# Patient Record
Sex: FEMALE | Race: WHITE | Employment: UNEMPLOYED | URBAN - METROPOLITAN AREA
[De-identification: names, ages, dates, MRNs, and addresses within clinical notes are randomized per-mention and may not be internally consistent; named-entity substitution may affect disease eponyms.]

---

## 2017-12-05 ENCOUNTER — OFFICE VISIT (OUTPATIENT)
Dept: FAMILY MEDICINE CLINIC | Age: 27
End: 2017-12-05

## 2017-12-05 VITALS
TEMPERATURE: 98.2 F | SYSTOLIC BLOOD PRESSURE: 100 MMHG | HEIGHT: 64 IN | HEART RATE: 90 BPM | OXYGEN SATURATION: 100 % | DIASTOLIC BLOOD PRESSURE: 65 MMHG | BODY MASS INDEX: 19.84 KG/M2 | WEIGHT: 116.2 LBS | RESPIRATION RATE: 18 BRPM

## 2017-12-05 DIAGNOSIS — Z13.0 SCREENING FOR DEFICIENCY ANEMIA: ICD-10-CM

## 2017-12-05 DIAGNOSIS — R39.11 URINARY HESITANCY: ICD-10-CM

## 2017-12-05 DIAGNOSIS — F11.10 HEROIN ABUSE (HCC): ICD-10-CM

## 2017-12-05 DIAGNOSIS — Z13.29 SCREENING FOR THYROID DISORDER: ICD-10-CM

## 2017-12-05 DIAGNOSIS — F41.9 ANXIETY AND DEPRESSION: Primary | ICD-10-CM

## 2017-12-05 DIAGNOSIS — F32.A ANXIETY AND DEPRESSION: Primary | ICD-10-CM

## 2017-12-05 DIAGNOSIS — N92.6 MISSED MENSES: ICD-10-CM

## 2017-12-05 LAB
BILIRUB UR QL STRIP: NEGATIVE
GLUCOSE UR-MCNC: NEGATIVE MG/DL
HCG URINE, QL. (POC): NEGATIVE
KETONES P FAST UR STRIP-MCNC: NEGATIVE MG/DL
PH UR STRIP: 7 [PH] (ref 4.6–8)
PROT UR QL STRIP: NEGATIVE
SP GR UR STRIP: 1.01 (ref 1–1.03)
UA UROBILINOGEN AMB POC: NORMAL (ref 0.2–1)
URINALYSIS CLARITY POC: CLEAR
URINALYSIS COLOR POC: YELLOW
URINE BLOOD POC: NEGATIVE
URINE LEUKOCYTES POC: NEGATIVE
URINE NITRITES POC: NEGATIVE
VALID INTERNAL CONTROL?: YES

## 2017-12-05 NOTE — PATIENT INSTRUCTIONS
Learning About Drug Misuse  What is drug misuse? Drug misuse means using drugs in a way that harms you or causes you to harm others. Your doctor may call it substance use disorder or drug abuse. It's possible to misuse almost any type of drug, including illegal drugs, prescription drugs, and over-the-counter drugs. An overdose happens when a person takes more than the normal or recommended amount of a drug. An overdose can result in harmful symptoms or death. Could you have a problem? If there's a chance you may be misusing drugs, it's important to find out. So ask yourself a few questions. · Do you spend a lot of time thinking about your medicine or other drug-getting it and using it? Has that taken the place of other things you used to enjoy? · Have you had problems with your family or friends, or at work, because of your use? · Do you use drugs even when you've told yourself you won't? Do you think you might have a problem? If you do, then you've just taken an important first step. Many people have overcome this problem. And most of them started by reaching out to others, like caring friends or family, their doctor, or a support group. How is drug misuse treated? If you think you may have a problem with drugs, talk to your doctor. You and your doctor can decide whether you have a problem and what type of treatment might help you. You may need to stay in a hospital at first, so that you can be treated for withdrawal symptoms. One of the goals of treatment is helping you get used to life without the drug. Counseling can help you prepare for people or situations that might tempt you to start using again. You can practice these skills through one-on-one counseling, family therapy, or group therapy. Therapy may be part of inpatient treatment, where you stay in a treatment center.  Or it may be part of outpatient treatment, where you can fit your therapy around your job or other responsibilities. Another goal of treatment is getting ongoing support for your drug-free life. Many people find support by going to meetings like Narcotics Anonymous or SMART Recovery. This type of support can help you feel less alone and more motivated to stay drug-free. You might talk to your doctor or do an online search for local treatment programs. Or you might tell a friend or loved one that you need help. Follow-up care is a key part of your treatment and safety. Be sure to make and go to all appointments, and call your doctor if you are having problems. It's also a good idea to know your test results and keep a list of the medicines you take. Where can you learn more? Go to http://timMapMyIDpiotr.info/. Enter [phone] in the search box to learn more about \"Learning About Drug Misuse. \"  Current as of: November 3, 2016  Content Version: 11.4  © 5712-1464 [employer]. Care instructions adapted under license by [place]  (which disclaims liability or warranty for this information). If you have questions about a medical condition or this instruction, always ask your healthcare professional. [address] any warranty or liability for your use of this information. Recovering From Depression: Care Instructions  Your Care Instructions    Taking good care of yourself is important as you recover from depression. In time, your symptoms will fade as your treatment takes hold. Do not give up. Instead, focus your energy on getting better. Your mood will improve. It just takes some time. Focus on things that can help you feel better, such as being with friends and family, eating well, and getting enough rest. But take things slowly. Do not do too much too soon. You will begin to feel better gradually. Follow-up care is a key part of your treatment and safety. Be sure to make and go to all appointments, and call your doctor if you are having problems. It's also a good idea to know your test results and keep a list of the medicines you take. How can you care for yourself at home? Be realistic  · If you have a large task to do, break it up into smaller steps you can handle, and just do what you can. · You may want to put off important decisions until your depression has lifted. If you have plans that will have a major impact on your life, such as marriage, divorce, or a job change, try to wait a bit. Talk it over with friends and loved ones who can help you look at the overall picture first.  · Reaching out to people for help is important. Do not isolate yourself. Let your family and friends help you. Find someone you can trust and confide in, and talk to that person. · Be patient, and be kind to yourself. Remember that depression is not your fault and is not something you can overcome with willpower alone. Treatment is necessary for depression, just like for any other illness. Feeling better takes time, and your mood will improve little by little. Stay active  · Stay busy and get outside. Take a walk, or try some other light exercise. · Talk with your doctor about an exercise program. Exercise can help with mild depression. · Go to a movie or concert. Take part in a Adventism activity or other social gathering. Go to a ball game. · Ask a friend to have dinner with you. Take care of yourself  · Eat a balanced diet with plenty of fresh fruits and vegetables, whole grains, and lean protein. If you have lost your appetite, eat small snacks rather than large meals. · Avoid drinking alcohol or using illegal drugs. Do not take medicines that have not been prescribed for you. They may interfere with medicines you may be taking for depression, or they may make your depression worse. · Take your medicines exactly as they are prescribed. You may start to feel better within 1 to 3 weeks of taking antidepressant medicine.  But it can take as many as 6 to 8 weeks to see more improvement. If you have questions or concerns about your medicines, or if you do not notice any improvement by 3 weeks, talk to your doctor. · If you have any side effects from your medicine, tell your doctor. Antidepressants can make you feel tired, dizzy, or nervous. Some people have dry mouth, constipation, headaches, sexual problems, or diarrhea. Many of these side effects are mild and will go away on their own after you have been taking the medicine for a few weeks. Some may last longer. Talk to your doctor if side effects are bothering you too much. You might be able to try a different medicine. · Get enough sleep. If you have problems sleeping:  ¨ Go to bed at the same time every night, and get up at the same time every morning. ¨ Keep your bedroom dark and quiet. ¨ Do not exercise after 5:00 p.m. ¨ Avoid drinks with caffeine after 5:00 p.m. · Avoid sleeping pills unless they are prescribed by the doctor treating your depression. Sleeping pills may make you groggy during the day, and they may interact with other medicine you are taking. · If you have any other illnesses, such as diabetes, heart disease, or high blood pressure, make sure to continue with your treatment. Tell your doctor about all of the medicines you take, including those with or without a prescription. · Keep the numbers for these national suicide hotlines: 7-156-592-TALK (6-727.184.5212) and 4-601-KNSRJOY (7-446.709.7855). If you or someone you know talks about suicide or feeling hopeless, get help right away. When should you call for help? Call 911 anytime you think you may need emergency care. For example, call if:  ? · You feel like hurting yourself or someone else. ? · Someone you know has depression and is about to attempt or is attempting suicide. ?Call your doctor now or seek immediate medical care if:  ? · You hear voices.    ? · Someone you know has depression and:  ¨ Starts to give away his or her possessions. ¨ Uses illegal drugs or drinks alcohol heavily. ¨ Talks or writes about death, including writing suicide notes or talking about guns, knives, or pills. ¨ Starts to spend a lot of time alone. ¨ Acts very aggressively or suddenly appears calm. ? Watch closely for changes in your health, and be sure to contact your doctor if:  ? · You do not get better as expected. Where can you learn more? Go to http://tim-piotr.info/. Enter N208 in the search box to learn more about \"Recovering From Depression: Care Instructions. \"  Current as of: May 12, 2017  Content Version: 11.4  © 1663-0520 [employer], [employer]. Care instructions adapted under license by [place] (which disclaims liability or warranty for this information). If you have questions about a medical condition or this instruction, always ask your healthcare professional. [place] [address] any warranty or liability for your use of this information.

## 2017-12-05 NOTE — PROGRESS NOTES
Chad Payan is a 32 y.o. female and presents with    Chief Complaint   Patient presents with    Establish Care    Depression    Anxiety    Urinary Hesitancy     foamy as well        Subjective:  Depression/anxiety/drug abuse  Patient complains of depression. She complains of depressed mood, weight loss, insomnia, psychomotor agitation, fatigue, feelings of worthlessness/guilt, difficulty concentrating and impaired memory. Onset was approximately several years ago, symptoms are stable at times when she is medicated properly. She states she was in long-term for a year and they gave her zoloft. She denies current suicidal and homicidal plan or intent. She does have a stated drug abuse problem, drug of choice heroin. She expresses that she uses to control psych issues and that her use is intermittent monthly. Patient also has complaints today of hair loss, weight loss, and acne. She states this is a new onset in past several months. No Known Allergies      Review of Systems   Constitutional: Positive for malaise/fatigue and weight loss. Skin:        Acne, hair loss   Psychiatric/Behavioral: Positive for depression and substance abuse. The patient is nervous/anxious and has insomnia. All other systems reviewed and are negative. Objective:  Vitals:    12/05/17 1444   BP: 100/65   Pulse: 90   Resp: 18   Temp: 98.2 °F (36.8 °C)   TempSrc: Oral   SpO2: 100%   Weight: 116 lb 3.2 oz (52.7 kg)   Height: 5' 4\" (1.626 m)   PainSc:   0 - No pain   LMP: 10/18/2017     Physical Exam   Constitutional: She appears distressed. Patient appears under nourished   HENT:   Mouth/Throat: Oropharynx is clear and moist.   Eyes: EOM are normal. Pupils are equal, round, and reactive to light. Neck: Normal range of motion. Neck supple. No JVD present. No thyromegaly present. Cardiovascular: Normal rate, regular rhythm and normal heart sounds.     Pulmonary/Chest: Effort normal and breath sounds normal. She has no wheezes. She has no rales. She exhibits no tenderness. Abdominal: Soft. Bowel sounds are normal. She exhibits no mass. There is no tenderness. Musculoskeletal: Normal range of motion. Skin: Skin is warm and dry. Facial acne scattered bilaterally       Assessment/Plan:     1. Urinary hesitancy  NO UTI Identified   - AMB POC URINE PREGNANCY TEST, VISUAL COLOR COMPARISON  - AMB POC URINALYSIS DIP STICK AUTO W/O MICRO    2. Missed menses  Discussed association of body stressors and irregular periods. Drug abuse, weight loss all associated. Pregnancy test negative today  - AMB POC URINE PREGNANCY TEST, VISUAL COLOR COMPARISON  - AMB POC URINALYSIS DIP STICK AUTO W/O MICRO    3. Anxiety and depression  Referral to psych for med mgmt was on Zoloft, trazodone, xanax in past per patient report. Discussed importance of initial tx through psych services given her lengthy history with drug abuse and inconsistency with medication mgmt.   - REFERRAL TO PSYCHIATRY    4. Heroin abuse  Currently attempting to get back into the methadone clinic for tx. Discussed with patient that she should utilize the methadone clinic if she is a chronic user and that if she needs assistance we will provide referral as needed. 5. Screening for anemia and thyroid  Discussed drawing labs to r/o any hormonal causes for hair loss and weight loss. Educated on effects of drugs on the body but that these labs can provide reassurance. Patient declines labs states she will come back to have them drawn. Labs ordered and pending. If she returns for lab draw we will call with results. I have discussed the diagnosis with the patient and the intended plan as seen in the above orders. The patient has received an after-visit summary and questions were answered concerning future plans. I have discussed medication side effects and warnings with the patient as well.  I have reviewed the plan of care with the patient, accepted their input and they are in agreement with the treatment goals.

## 2017-12-05 NOTE — PROGRESS NOTES
Phan Holly is a 32 y.o. female (: 1990) presenting to address:    Chief Complaint   Patient presents with   [facility]    Skin Problem    Depression    Anxiety    Hair/Scalp Problem     hair loss     Urinary Hesitancy     foamy as well      Patient does state she last used heroin  x 2 days ago. Vitals:    17 1444   BP: 100/65   Pulse: 90   Resp: 18   Temp: 98.2 °F (36.8 °C)   TempSrc: Oral   SpO2: 100%   Weight: 116 lb 3.2 oz (52.7 kg)   Height: 5' 4\" (1.626 m)   PainSc:   0 - No pain   LMP: 10/18/2017       Hearing/Vision:   No exam data present    Learning Assessment:     Learning Assessment 2017   PRIMARY LEARNER Patient   HIGHEST LEVEL OF EDUCATION - PRIMARY LEARNER  SOME COLLEGE   BARRIERS PRIMARY LEARNER NONE   CO-LEARNER CAREGIVER No   PRIMARY LANGUAGE ENGLISH   LEARNER PREFERENCE PRIMARY READING   ANSWERED BY self   RELATIONSHIP SELF     Depression Screening:     PHQ over the last two weeks 2017   PHQ Not Done Active Diagnosis of Depression or Bipolar Disorder   Little interest or pleasure in doing things Nearly every day   Feeling down, depressed or hopeless Nearly every day   Total Score PHQ 2 6     Fall Risk Assessment:     Fall Risk Assessment, last 12 mths 2017   Able to walk? Yes   Fall in past 12 months? No     Abuse Screening:     Abuse Screening Questionnaire 2017   Do you ever feel afraid of your partner? N   Are you in a relationship with someone who physically or mentally threatens you? N   Is it safe for you to go home? Y     Coordination of Care Questionaire:   1. Have you been to the ER, urgent care clinic since your last visit? Hospitalized since your last visit? [facility] Abdominal Pain    2. Have you seen or consulted any other health care providers outside of the [address] [place] [address] since your last visit? Include any pap smears or colon screening. NO    Advanced Directive:   1. Do you have an Advanced Directive? NO    2. Would you like information on Advanced Directives?  NO

## 2018-12-18 ENCOUNTER — OFFICE VISIT (OUTPATIENT)
Dept: FAMILY MEDICINE CLINIC | Age: 28
End: 2018-12-18

## 2018-12-18 VITALS
SYSTOLIC BLOOD PRESSURE: 119 MMHG | OXYGEN SATURATION: 100 % | RESPIRATION RATE: 18 BRPM | BODY MASS INDEX: 22.02 KG/M2 | TEMPERATURE: 98.1 F | WEIGHT: 129 LBS | HEART RATE: 71 BPM | DIASTOLIC BLOOD PRESSURE: 77 MMHG | HEIGHT: 64 IN

## 2018-12-18 DIAGNOSIS — F41.9 ANXIETY AND DEPRESSION: Primary | ICD-10-CM

## 2018-12-18 DIAGNOSIS — F11.10 HEROIN ABUSE (HCC): ICD-10-CM

## 2018-12-18 DIAGNOSIS — F32.A ANXIETY AND DEPRESSION: Primary | ICD-10-CM

## 2018-12-18 RX ORDER — BUPROPION HYDROCHLORIDE 150 MG/1
TABLET, EXTENDED RELEASE ORAL 2 TIMES DAILY
COMMUNITY
End: 2021-09-03

## 2018-12-18 NOTE — PROGRESS NOTES
Tracy Roa is a 25 y.o.  female and presents with    Chief Complaint   Patient presents with    Anxiety         Subjective:  Patient presents for follow up on psych referral from last year. She states she moved out of state for short time and is back now. She states she is struggling with increased anxiety and does not believe her medication is working well anymore. She denies suicidal /homicidal ideations. She states she feels anxious most days. Was referred to psych last year she did not go. Current Outpatient Medications   Medication Sig Dispense Refill    buPROPion SR (WELLBUTRIN SR) 150 mg SR tablet Take  by mouth two (2) times a day. No Known Allergies    Review of Systems   Psychiatric/Behavioral: Positive for depression. The patient is nervous/anxious. All other systems reviewed and are negative. Objective:  Vitals:    12/18/18 1428   BP: 119/77   Pulse: 71   Resp: 18   Temp: 98.1 °F (36.7 °C)   TempSrc: Oral   SpO2: 100%   Weight: 129 lb (58.5 kg)   Height: 5' 4\" (1.626 m)   PainSc:   0 - No pain     General:   Well-groomed, well-nourished, in mild distress, alert, appropriate    Cardiovasc:   No JVD. RRR,  no murmur, rubs or gallops. Pulmonary:    Lungs clear bilaterally, no wheezing, rales or rhonchi. Psych:  Alert and oriented, No pressured speech or abnormal thought content, anxious appearing      Assessment/Plan:      1. Anxiety and depression  Keep meds same until seen by psych.   - REFERRAL TO PSYCHIATRY    2. [facility]  - REFERRAL TO PSYCHIATRY    I have discussed the diagnosis with the patient and the intended plan as seen in the above orders. The patient has received an after-visit summary and questions were answered concerning future plans. I have discussed medication side effects and warnings with the patient as well. I have reviewed the plan of care with the patient, accepted their input and they are in agreement with the treatment goals. Follow-up Disposition:  Return if symptoms worsen or fail to improve. More than 1/2 of this 30 minute visit was spent in counselling and coordination of care, as described above.     Keegan BERUMEN

## 2018-12-18 NOTE — PATIENT INSTRUCTIONS

## 2018-12-18 NOTE — PROGRESS NOTES
Sandra Cheng is a 25 y.o. female (: 1990) presenting to address:anxiety concerns     Chief Complaint   Patient presents with    Anxiety       Vitals:    18 1428   BP: 119/77   Pulse: 71   Resp: 18   Temp: 98.1 °F (36.7 °C)   TempSrc: Oral   SpO2: 100%   Weight: 129 lb (58.5 kg)   Height: 5' 4\" (1.626 m)   PainSc:   0 - No pain       Hearing/Vision:   No exam data present    Learning Assessment:     Learning Assessment 2017   PRIMARY LEARNER Patient   HIGHEST LEVEL OF EDUCATION - PRIMARY LEARNER  SOME COLLEGE   BARRIERS PRIMARY LEARNER NONE   CO-LEARNER CAREGIVER No   PRIMARY LANGUAGE ENGLISH   LEARNER PREFERENCE PRIMARY READING   ANSWERED BY self   RELATIONSHIP SELF     Depression Screening:     PHQ over the last two weeks 2017   PHQ Not Done Active Diagnosis of Depression or Bipolar Disorder   Little interest or pleasure in doing things Nearly every day   Feeling down, depressed, irritable, or hopeless Nearly every day   Total Score PHQ 2 6     Fall Risk Assessment:     Fall Risk Assessment, last 12 mths 2017   Able to walk? Yes   Fall in past 12 months? No     Abuse Screening:     Abuse Screening Questionnaire 2017   Do you ever feel afraid of your partner? N   Are you in a relationship with someone who physically or mentally threatens you? N   Is it safe for you to go home? Y     Coordination of Care Questionaire:   1. Have you been to the ER, urgent care clinic since your last visit? Hospitalized since your last visit? no    2. Have you seen or consulted any other health care providers outside of the [address] [place] [address] since your last visit? Include any pap smears or colon screening. no    Advanced Directive:   1. Do you have an Advanced Directive? no    2. Would you like information on Advanced Directives? No    Patient declined flu vaccine.

## 2021-09-03 ENCOUNTER — OFFICE VISIT (OUTPATIENT)
Dept: SPORTS MEDICINE | Age: 31
End: 2021-09-03
Payer: MEDICAID

## 2021-09-03 VITALS
RESPIRATION RATE: 16 BRPM | SYSTOLIC BLOOD PRESSURE: 112 MMHG | WEIGHT: 131 LBS | DIASTOLIC BLOOD PRESSURE: 60 MMHG | TEMPERATURE: 97.7 F | BODY MASS INDEX: 22.49 KG/M2 | HEART RATE: 71 BPM

## 2021-09-03 DIAGNOSIS — M79.644 CHRONIC PAIN OF RIGHT THUMB: ICD-10-CM

## 2021-09-03 DIAGNOSIS — G89.29 CHRONIC PAIN OF RIGHT THUMB: ICD-10-CM

## 2021-09-03 DIAGNOSIS — M54.2 NECK PAIN ON RIGHT SIDE: Primary | ICD-10-CM

## 2021-09-03 DIAGNOSIS — R20.2 ARM PARESTHESIA, RIGHT: ICD-10-CM

## 2021-09-03 PROCEDURE — 99203 OFFICE O/P NEW LOW 30 MIN: CPT | Performed by: FAMILY MEDICINE

## 2021-09-03 RX ORDER — MELOXICAM 15 MG/1
15 TABLET ORAL DAILY
Qty: 30 TABLET | Refills: 1 | Status: SHIPPED | OUTPATIENT
Start: 2021-09-03 | End: 2021-10-01

## 2021-09-03 RX ORDER — MELOXICAM 15 MG/1
15 TABLET ORAL DAILY
Qty: 30 TABLET | Refills: 1 | Status: SHIPPED | OUTPATIENT
Start: 2021-09-03 | End: 2021-09-03 | Stop reason: SDUPTHER

## 2021-09-03 NOTE — PATIENT INSTRUCTIONS
For your neck:  - x-rays today  - some specific exercises  - meloxicam 15mg daily (NO ibuprofen, naproxen; if you need to, may add tyenol / acetaminophen)  Future considerations: formal physical therapy, MRI neck      For your thumb:  - thumb brace as much as possible  - ice as needed to decrease inflammation  - exercise  Future considerations: if still severe, may consider steroid injection to decrease inflammation            TESTING / IMAGING RESULTS       If you have MyChart access:   Per federal regulations as of October 20th, 2020, all of your results will be released to you and to your physician / provider simultaneously on [employer].    o This means that it is likely that you will have the opportunity to review your results before your physician / provider!  Since all \"critical\" abnormal results are immediately called to the office / on-call providers on nights, weekends and holidays - please refrain from calling after hours when you receive abnormal results through [address] Instead, allow time for your physician / provider to review your results and then provide interpretation and/or guidance.  If the results are significantly abnormal and require time-sensitive action - guidance will be provided both via [employer] and via telephone.  For all other results (interpreted as \"normal\", \"abnormal but expected\", \"reassuring / stable\", \"slightly abnormal\") - non-urgent guidance will be provided via [employer] communication only. [place]  #[phone]      If you do NOT have [employer] access:   If the results are significantly abnormal and require time-sensitive action - guidance will be relayed to you via telephone.  For all other results (interpreted as \"normal\", \"abnormal but expected\", \"reassuring / stable\", \"slightly abnormal\") - non-urgent guidance will be mailed to you via [employer]      Results from Outside Facilities / Laboratories:  Results of tests performed at outside facilities / laboratories likely will not appear in the [facility].  o They may appear in the patient portals of those outside facilities / laboratories. o Please keep in mind that with your access to your patient portal directly to an outside facility / laboratory, you are likely viewing results before your physician / provider! Please allow time for your physician / provider to review your results and then provide interpretation and/or guidance. If you have questions about your results beyond the guidance provided in MyChart or in your results letter, please schedule a follow up appointment to discuss with your physician / provider. MEDICATION REFILLS         Please request medication refills through your pharmacy, to ensure the correct pharmacy is used.  Please allow at least 3 business days for refill requests to be addressed.  Refills will not be provided by the after hours/on call provider. Neck Arthritis: Exercises  Introduction  Here are some examples of exercises for you to try. The exercises may be suggested for a condition or for rehabilitation. Start each exercise slowly. Ease off the exercises if you start to have pain. You will be told when to start these exercises and which ones will work best for you. How to do the exercises  Neck stretches to the side   1. This stretch works best if you keep your shoulder down as you lean away from it. To help you remember to do this, start by relaxing your shoulders and lightly holding on to your thighs or your chair. 2. Tilt your head toward your shoulder and hold for 15 to 30 seconds. Let the weight of your head stretch your muscles. 3. Repeat 2 to 4 times toward each shoulder. Chin tuck   1. Lie on the floor with a rolled-up towel under your neck. Your head should be touching the floor. 2. Slowly bring your chin toward your chest.  3. Hold for a count of 6, and then relax for up to 10 seconds.   4. Repeat 8 to 12 times. Active cervical rotation   1. Sit in a firm chair, or stand up straight. 2. Keeping your chin level, turn your head to the right, and hold for 15 to 30 seconds. 3. Turn your head to the left and hold for 15 to 30 seconds. 4. Repeat 2 to 4 times to each side. Shoulder blade squeeze   1. While standing, squeeze your shoulder blades together. 2. Do not raise your shoulders up as you are squeezing. 3. Hold for 6 seconds. 4. Repeat 8 to 12 times. Shoulder rolls   1. Sit comfortably with your feet shoulder-width apart. You can also do this exercise standing up. 2. Roll your shoulders up, then back, and then down in a smooth, circular motion. 3. Repeat 2 to 4 times. Follow-up care is a key part of your treatment and safety. Be sure to make and go to all appointments, and call your doctor if you are having problems. It's also a good idea to know your test results and keep a list of the medicines you take. Where can you learn more? Go to http://www.gray.com/  Enter T556 in the search box to learn more about \"Neck Arthritis: Exercises. \"  Current as of: November 16, 2020               Content Version: 12.8  © 2006-2021 Healthwise, Incorporated. Care instructions adapted under license by [employer] (which disclaims liability or warranty for this information). If you have questions about a medical condition or this instruction, always ask your healthcare professional. [place] [address] any warranty or liability for your use of this information. Gricel  Disease: Exercises  Introduction  Here are some examples of exercises for you to try. The exercises may be suggested for a condition or for rehabilitation. Start each exercise slowly. Ease off the exercises if you start to have pain. You will be told when to start these exercises and which ones will work best for you. How to do the exercises  Thumb lifts   1.  Place your hand on a flat surface, with your palm up. 2. Lift your thumb away from your palm to make a \"C\" shape. 3. Hold for about 6 seconds. 4. Repeat 8 to 12 times. Passive thumb MP flexion   1. Hold your hand in front of you, and turn your hand so your little finger faces down and your thumb faces up. (Your hand should be in the position used for shaking someone's hand.) You may also rest your hand on a flat surface. 2. Use the fingers on your other hand to bend your thumb down at the point where your thumb connects to your palm. 3. Hold for at least 15 to 30 seconds. 4. Repeat 2 to 4 times. Finkelstein stretch   1. Hold your arms out in front of you. (Your hand should be in the position used for shaking someone's hand.)  2. Bend your thumb toward your palm. 3. Use your other hand to gently stretch your thumb and wrist downward until you feel the stretch on the thumb side of your wrist.  4. Hold for at least 15 to 30 seconds. 5. Repeat 2 to 4 times. Resisted ulnar deviation   For this exercise, you will need elastic exercise material, such as surgical tubing or Thera-Band. 1. Sit leaning forward with your legs slightly spread and your elbow on your thigh. 2. Grasp one end of the band with your palm down, and step on the other end with the foot opposite the hand holding the band. 3. Slowly bend your wrist sideways and away from your knee. 4. Repeat 8 to 12 times. Follow-up care is a key part of your treatment and safety. Be sure to make and go to all appointments, and call your doctor if you are having problems. It's also a good idea to know your test results and keep a list of the medicines you take. Where can you learn more? Go to http://www.gray.com/  Enter E301 in the search box to learn more about \"De Quervain's Disease: Exercises. \"  Current as of: November 16, 2020               Content Version: 12.8  © 7727-4054 Healthwise, Incorporated.    Care instructions adapted under license by [address] (which disclaims liability or warranty for this information). If you have questions about a medical condition or this instruction, always ask your healthcare professional. [address] any warranty or liability for your use of this information.

## 2021-09-03 NOTE — PROGRESS NOTES
[address]  Sports Medicine Consultation Note    PCP: None  Requesting provider: self-referral       Jose Rosales is a 26 y.o. female (: 1990) presenting to obtain consultative services regarding:  Chief Complaint   Patient presents with    Thumb Pain     right    Neck Pain     mid to right side        Assessment/Plan:       ICD-10-CM ICD-9-CM   1. Neck pain on right side  M54.2 723.1   2. Arm paresthesia, right  R20.2 782.0   3. Chronic pain of right thumb  M79.644 729.5    G89.29 338.29     31yo F 2.5mo postpartum with chronic RIGHT neck pain and RIGHT thumb pain, recently worsened over the last few months. Is breast and formula feeding, but tapering off on the breast feeding as she has recently returned to work. Pertinent exam findings: RIGHT sided paraspinal muscle tenderness, decreased sensation RIGHT C5 and C6 dermatomes, + cubital tunnel tinel bilaterally, + finkelstein on the RIGHT. RIGHT thumb pain:  Reassurance that frequently tendinopathies are temporarily worse in the peripartum period. > home exercise plan  > immobilize with thumb spica when painful and when sleeping  > ice as needed  Future considerations: steroid injection    Chronic RIGHT neck pain with RIGHT arm paresthesia:  > radiograph(s)   > home exercise plan  > trial of mobic daily  Future considerations: formal physical therapy, advanced imaging      Orders Placed This Encounter    Generic Supply Order     Left thumb spica splint. Stabilize joint, decrease pain.  XR SPINE CERV W OBL/FLEX/EXT MIN 6 V COMP     Standing Status:   Future     Number of Occurrences:   1     Standing Expiration Date:   9/3/2022     Order Specific Question:   Is Patient Pregnant? Answer:   No     Order Specific Question:   Reason for Exam     Answer:   neck pain     Order Specific Question:   Which facility to perform procedure? Answer:   [facility]    meloxicam (MOBIC) 15 mg tablet     Sig: Take 1 Tablet by mouth daily. Dispense:  30 Tablet     Refill:  1             Management plan & patient instructions discussed with Yung Oswald, who voiced understanding. Thank you for the opportunity to participate in the care of this patient. If any questions or concerns at all, please feel free to contact me. This document may have been created with the aid of dictation software. Text may contain errors, particularly phonetic errors. Ayala Tomlin MD  Internal Medicine, Family Medicine & Sports Medicine  9/3/2021      Subjective   History:     I was asked to provide consultative services by self-referral for advice/opinion related to evaluating    Chief Complaint   Patient presents with    Thumb Pain     right    Neck Pain     mid to right side        Yung Oswald is a 26 y.o. female, currently ~3mo postpartum, presents with chronic RIGHT sided neck pain and RIGHT hand/thumb pain for >1 year, but significantly worse over the last few months. Denies any precipitating incident or trauma. Neurologic symptoms: No weakness, bowel or bladder changes. No recent falls      + numbness/tingling in the RIGHT hand involving all fingers, and RIGHT side of neck. + muscle spasms in thumb    Doesn't seem as bad in the morning. Location: The pain is located in the RIGHT neck and RIGHT hand. Radiation: The pain does radiate down RIGHT upper extremity. .    Pain Score: Currently: 7/10  At Best: 4/10  At Worst: 9/10 - 1 week ago. Quality: Pain is of a Stabbing, Dull and throbbing quality. Alleviating:  The pain is alleviated by nothing      Prior Treatments:   [employer] \"so many times, and sometimes it helps but only for a little bit\"   Massages   bloodwork with PCP several years ago   Magnesium spray   Ice   Heat   (no physical therapy)    Previous Medications:     - clinically ineffective    Current Medications:   none    Previous work-up has included:    No imaging      Pain Assessment 9/3/2021   Location of Pain Neck;Finger   Pain Location Comment -   Location Modifiers Right   Severity of Pain 7   Quality of Pain Throbbing; Sharp;Dull   Quality of Pain Comment -   Duration of Pain Persistent   Frequency of Pain Constant   Date Pain First Started (No Data)   Date Pain First Started Comment about a year ago - has gotten worse over the past couple of months   Aggravating Factors -   Relieving Factors Nothing           Past Medical History:   Diagnosis Date    Headache(784.0)      No past surgical history on file. reports that she has been smoking. She has a 0.25 pack-year smoking history. She has never used smokeless tobacco. She reports current drug use. Drug: Heroin. She reports that she does not drink alcohol. Family History   Problem Relation Age of Onset    Asthma Mother     Asthma Brother      No Known Allergies    Problem List:      Patient Active Problem List    Diagnosis    Generalized anxiety disorder       Medications:     Current Outpatient Medications on File Prior to Visit   Medication Sig Dispense Refill    buprenorphine HCl/naloxone HCl (SUBOXONE SL) 3 mg by SubLINGual route. No current facility-administered medications on file prior to visit. Objective   Physical Assessment:   VS:    Vitals:    09/03/21 0824   BP: 112/60   Pulse: 71   Resp: 16   Temp: 97.7 °F (36.5 °C)   Weight: 131 lb (59.4 kg)     Physical Exam  Nursing note reviewed. Constitutional:       General: She is not in acute distress. Appearance: She is well-developed. She is not diaphoretic. HENT:      Head: Normocephalic and atraumatic. Mouth/Throat:      Comments: Mask in place  Eyes:      Conjunctiva/sclera: Conjunctivae normal.   Musculoskeletal:      Right shoulder: No tenderness or bony tenderness. Normal range of motion. Left shoulder: No tenderness or bony tenderness. Normal range of motion. Right elbow: Normal range of motion. No tenderness.       Left elbow: Normal range of motion. No tenderness. Right forearm: No deformity or tenderness. Left forearm: Tenderness (+ cubital tunnel tinel) present. No deformity. Right wrist: No tenderness or bony tenderness. Normal range of motion. Left wrist: No tenderness or bony tenderness. Normal range of motion. Right hand: Tenderness (1st extensor compartment) present. No bony tenderness. Normal range of motion. Normal strength. Normal sensation. Normal capillary refill. Left hand: No tenderness or bony tenderness. Normal range of motion. Normal strength. Normal sensation. Normal capillary refill. Cervical back: Neck supple. Tenderness (right UTm) present. No deformity, signs of trauma, spasms, torticollis or bony tenderness. Pain with movement (with rightward flexion) present. Normal range of motion. Comments: Negative spurlings however causes myofascial discomfort along RIGHT side; + RIGHT finkelstein   Skin:     General: Skin is warm and dry. Findings: No rash. Neurological:      Mental Status: She is alert and oriented to person, place, and time. Sensory: Sensory deficit (dec sensation to light touch over R C5 and C6 dermatomes) present. Motor: No weakness or atrophy. Gait: Gait normal.   Psychiatric:         Behavior: Behavior normal.         Thought Content:  Thought content normal.

## 2021-10-01 ENCOUNTER — OFFICE VISIT (OUTPATIENT)
Dept: SPORTS MEDICINE | Age: 31
End: 2021-10-01
Payer: MEDICAID

## 2021-10-01 VITALS — RESPIRATION RATE: 16 BRPM | HEART RATE: 78 BPM | TEMPERATURE: 97.7 F

## 2021-10-01 DIAGNOSIS — G89.29 CHRONIC PAIN OF RIGHT THUMB: ICD-10-CM

## 2021-10-01 DIAGNOSIS — R20.2 ARM PARESTHESIA, RIGHT: ICD-10-CM

## 2021-10-01 DIAGNOSIS — M54.2 NECK PAIN ON RIGHT SIDE: Primary | ICD-10-CM

## 2021-10-01 DIAGNOSIS — M79.644 CHRONIC PAIN OF RIGHT THUMB: ICD-10-CM

## 2021-10-01 PROCEDURE — 99214 OFFICE O/P EST MOD 30 MIN: CPT | Performed by: FAMILY MEDICINE

## 2021-10-01 NOTE — PATIENT INSTRUCTIONS
For your neck:  - x-rays were reassuring (no significant bony abnormalities)  - work on self-applied accupressure on trigger points (see info below)  - stop meloxicam  - start formal physical therapy  - Be sure to give the physical therapists some feedback! Let them know: what your short term & long term goals are. .. If exercises seems too easy or too hard. .. If the previous PT session left you more sore than usual, etc.  It is important for them to know these things in order to tailor your rehab plan. The [facility] office should be calling you to schedule in the next 3 business days. If you don't hear from them after 1 wk, call their office directly to check on the status of your referral.        For your thumb:  - thumb brace as much as possible   - ice as needed to decrease inflammation  The Dr. Margarita Waite office should be calling you to schedule in the next 1-2 weeks. If you don't hear from them after 2 weeks, call their office directly to check on the status of your referral.  (He has multiple locations, including one in [place] in [place]!)              · The Body Back Buddy (available on [employer])  · The Back Kaelyn (available at [employer])          How to Apply Pressure to Trigger Points   Use prolonged finger pressure directly on the point; gradual, steady, penetrating pressure for approximately three minutes is ideal. Each point will feel somewhat different when you press it; some points feel tense, while others are often sore or ache when pressed. How much pressure to apply to any point depends on how fit you are. A general guideline to follow is that the pressure should be firm enough so that it \"hurts good\" - in other words, something between pleasant, firm pressure and outright pain.    The more developed the muscles are, the more pressure you should apply If you feel extreme (or increasing) sensitivity or pain, gradually decrease the pressure until you find a balance between pain and pleasure. Acupressure is not meant to increase your tolerance of pain, so do not think of it as a test of endurance. Do not continue to press a point that is excruciatingly painful. Usually, however, if you firmly hold the point long enough (up to 2 minutes using the middle finger with your index and ring fingers on either side as support), the pain will diminish. Note that sometimes when you hold a point, you'll feel pain in another part of your body This phenomenon is called referred pain and indicates that those areas are related. You should press points in these related areas as well to release blockages. The middle finger is the longest and strongest of your fingers and is best suited for applying self-acupressure. The thumb is strong, too, but often lacks sensitivity If you find that your hand is generally weak or hurt s when you apply finger pressure, you can use the knuckles or your fist or other tools, such as an avocado pit, a golf ball, or a pencil eraser. Although you may be tempted to massage or rub the entire area, it is best just to hold the point steadily with direct finger pressure. The rule of thumb is to apply slow, firm pressure on the point at a 90 degree angle from the surface of the skin. If you are pulling the skin, then the angle of pressure is incorrect. Consciously and gradually direct the pressure into the center of the part of the body you are working on. It's important to apply and release finger pressure gradually because this allows the tissues time to respond, promoting healing. The better your concentration as you move your fingers slowly into and out of the point, the more effective the treatment will be. After repeated acupressure sessions using different degrees of pressure, you will begin to feel a pulse at the point. This pulsation is a good sign - it means that circulation has increased. Pay attention to the type of pulse you feel.  If it's very faint or throbbing, hold the point longer until the pulse balances. If your hand gets tired, slowly withdraw pressure from the point, gently shake out your hand, and take a few deep breaths. When you're ready, go back to the point and gradually apply pressure until you reach the depth where it hurts good. Again, press directly on painful site (which often moves, so follow and stay with it) until you feel a clear, regular pulse or until the pain diminishes. Then slowly decrease the finger pressure, ending with about twenty seconds of light touch. When you have located the point and your fingers are comfortably positioned right on the spot gradually lean your weight toward the point to apply the pressure. If you're pressing a point on your foot, for instance, bend your leg and apply pressure by slowly leaning forward . Using the weight of your upper body (and not just your hands) enables you to apply firm pressure without strain. Direct the pressure perpendicularly to the surface of the skin as you take several long, slow, deep breaths. Hold for a few minutes until you feel a regular pulse or until the soreness at the point decreases. Then gradually release the pressure, finishing with a soothing touch.

## 2021-10-01 NOTE — PROGRESS NOTES
[facility]  Sports Medicine Office Visit    Anita Cao is a 26 y.o. female (: 1990) presenting to address:  Chief Complaint   Patient presents with    Neck Pain    Thumb Pain     right       PCP: None  Referring provider: self-referral         Assessment/Plan:       ICD-10-CM ICD-9-CM   1. Neck pain on right side  M54.2 723.1   2. Arm paresthesia, right  R20.2 782.0   3. Chronic pain of right thumb  M79.644 729.5    G89.29 338.29       29yo F 3mo postpartum initially presented early 2021 with chronic RIGHT neck pain and RIGHT thumb pain, recently worsened over the last few months. Is breast and formula feeding, but tapering off on the breast feeding as she has recently returned to work. Since last visit:  No change in neck symptoms with home exercise plan and daily mobic; thumb feels better in thumb spica, but still ongoing sx, generally worse towards end of day     Pertinent exam findings: RIGHT sided palpable and painful TrP upper trap, scalenes, + cubital tunnel tinel bilaterally, and now negative Finkelstein on RIGHT    Chronic RIGHT neck pain with RIGHT arm paresthesia: likely myofascial, particularly as both \"feel better\" brief applied pressure to TrP  > start formal PT  > advised re: massage cane and accupressure   > f/u as needed      RIGHT thumb pain: interestingly exam is quite benign today, without any tenderness of 1st extensor compartment  > amenable to referral to ortho hand  > continue thumb spica use when possible       Follow-up and Dispositions    · Return call for appt if neck symptoms worsen or fail to improve after 2mo.          Orders Placed This Encounter    EMPL [facility]     Referral Priority:   Routine     Referral Type:   PT/OT/ST     Referral Reason:   Specialty Services Required     Number of Visits Requested:   1    EMPL [facility]     Referral Priority:   Routine     Referral Type:   Consultation     Referral Reason:   Specialty Services Required     Referred to Provider:   Paxton Hall DO     Number of Visits Requested:   1         Management plan & patient instructions discussed with Shana Kuhn, who voiced understanding. This document may have been created with the aid of dictation software. Text may contain errors, particularly phonetic errors. Teresa Castillo MD  Internal Medicine, Family Medicine & Sports Medicine  10/1/2021    On this date 10/1/2021, I have spent 30 minutes providing care to this patient, which included reviewing the EMR to see if there were any recent visits to the ED, specialists, prior lab or radiology results, obtaining the history from the patient, examining the patient, providing discharge education regarding the diagnosis and counseling on appropriate follow-up, as well as documenting this visit in the EMR. Subjective   History:   Shana Kuhn is a 26 y.o. female who presents to address:  Chief Complaint   Patient presents with    Neck Pain    Thumb Pain     right       Last visit 03 Sept 2021    Now 3mo pospartum. Still doing both breastmilk and formula. NECK:  Constant 8 of 10, RIGHT sided  No improvement even with home exercise plan  Was taking meloxicam daily but did not notice any improvement, thus stopped few days ago. THUMB:  Currently 5 of 10 \"but it is early in the day\"  Worst: towards end of day, 8-9 of 10  Thumb feels good in brace, worse outside of brace. Home exercise plan Meet Christiansen helps some? \"      Pain Assessment  10/1/2021   Location of Pain Neck;Hand   Pain Location Comment -   Location Modifiers Right   Severity of Pain 6   Quality of Pain Sharp;Dull;Aching   Quality of Pain Comment -   Duration of Pain Persistent   Frequency of Pain Constant   Date Pain First Started -   Date Pain First Started Comment -   Aggravating Factors Other (Comment)   Limiting Behavior Yes   Relieving Factors Rest;Nothing           Past Medical History: Diagnosis Date    Headache(784.0)      No past surgical history on file. reports that she has been smoking. She has a 0.25 pack-year smoking history. She has never used smokeless tobacco. She reports current drug use. Drug: Heroin. She reports that she does not drink alcohol. Family History   Problem Relation Age of Onset    Asthma Mother     Asthma Brother      No Known Allergies    Problem List:      Patient Active Problem List    Diagnosis    Generalized anxiety disorder       Medications:     Current Outpatient Medications on File Prior to Visit   Medication Sig Dispense Refill    buprenorphine HCl/naloxone HCl (SUBOXONE SL) 3 mg by SubLINGual route. No current facility-administered medications on file prior to visit. Objective   Physical Assessment:     Vitals:    10/01/21 0839   Pulse: 78   Resp: 16   Temp: 97.7 °F (36.5 °C)   PainSc:   6       Physical Exam  Nursing note reviewed. Constitutional:       General: She is not in acute distress. Appearance: She is well-developed. She is not diaphoretic. HENT:      Head: Normocephalic and atraumatic. Mouth/Throat:      Comments: Mask in place  Eyes:      Conjunctiva/sclera: Conjunctivae normal.   Musculoskeletal:      Right hand: No tenderness or bony tenderness. Normal range of motion. Normal strength. Left hand: No tenderness or bony tenderness. Normal range of motion. Normal strength. Cervical back: Neck supple. Spasms and tenderness (R scalene > R upper trap) present. Pain with movement present. Comments: Head forward, shoulders rolled; now negative finkelstein and nontender 1st exstensor compartment   Skin:     General: Skin is warm and dry. Findings: No rash. Neurological:      Mental Status: She is alert and oriented to person, place, and time. Comments: + bilateral cubital tunnel   Psychiatric:         Behavior: Behavior normal.         Thought Content:  Thought content normal.         Recent Labs & Imaging:     XR Results (most recent):  Results from Appointment encounter on 09/03/21    XR SPINE CERV W OBL/FLEX/EXT MIN 6 V COMP    Narrative  EXAM: XR SPINE CERV W OBL/FLEX/EXT MIN 6 V COMP    INDICATION: neck pain. Arm paresthesia, right. Neck pain for 2 years. No known trauma. Occasional numbness in the fingers on  the right side. COMPARISON: None. TECHNIQUE: 7 views of the cervical spine were obtained. FINDINGS:  No prevertebral soft tissue swelling. Vertebral body heights are maintained. Disc heights are relatively well-maintained. No listhesis. No evidence for  instability on flexion or extension. No right neuroforaminal stenosis. Possible  stenosis of the lower left neural foramina, but evaluation is limited due to  patient positioning. Further evaluation with MRI may be useful. Visualized lung apices are clear. Impression  As above.

## 2021-10-20 ENCOUNTER — APPOINTMENT (OUTPATIENT)
Dept: PHYSICAL THERAPY | Age: 31
End: 2021-10-20
Payer: MEDICAID

## 2021-10-25 ENCOUNTER — HOSPITAL ENCOUNTER (OUTPATIENT)
Dept: PHYSICAL THERAPY | Age: 31
Discharge: HOME | End: 2021-10-25
Payer: MEDICAID

## 2021-10-25 PROCEDURE — 97161 PT EVAL LOW COMPLEX 20 MIN: CPT

## 2021-10-25 NOTE — PROGRESS NOTES
[address], [address], [facility] - Phone: [phone]  Fax: [phone] / [address]  Patient Name: Adolph Urias : 1990   Medical   Diagnosis: Neck pain [M54.2] Treatment Diagnosis: Cervical Pain   Onset Date:      Referral Source: Ana Michelle MD [place] [facility]): 10/25/2021   Prior Hospitalization: See medical history Provider #: [id number]   Prior Level of Function: Hx chronic cervical pain   Comorbidities: None reported   Medications: Verified on Patient Summary List   The Plan of Care and following information is based on the information from the initial evaluation.   ===========================================================================================  Assessment / key information:  Adolph Urias is a 33 y.o.  yo female with Dx of Neck pain [M54.2]. Patient reports insidious onset of symptoms about 2 years ago. Patient currently rates pain as 6/10 at worst, 10/10 at best, primarily located at cervical spine. 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 Objective Findings:  cervical AROM: flexion 50 degrees, extension 70 degrees, right lateral flexion 15 degrees, left 20 degrees, right rotation 57 degrees, left 65 degrees. Manual Muscle Testin+/5 bilateral UE strength. Decreased thoracic mobility noted, right more limited than left. Increased tone cervical and thoracic paraspinals. Patient reports intermittent pain right hand, but denies sharp/shotting pain, tingling or numbness right UE. FOTO Score= 44 points.   Pt instructed in HEP and will f/u in clinic for PT.  ===========================================================================================  Eval Complexity: History HIGH Complexity :3+ comorbidities / personal factors will impact the outcome/ POC ;  Examination  HIGH Complexity : 4+ Standardized tests and measures addressing body structure, function, activity limitation and / or participation in recreation ; Presentation LOW Complexity : Stable, uncomplicated ;  Decision Making MEDIUM Complexity : FOTO score of 26-74; Overall Complexity LOW   Problem List: pain affecting function, decrease ROM, decrease strength, decrease activity tolerance and decrease flexibility/ joint mobility   Treatment Plan may include any combination of the following: Therapeutic exercise, Therapeutic activities, Neuromuscular re-education, Physical agent/modality, Manual therapy, Patient education and Self Care training  Patient / Family readiness to learn indicated by: asking questions, trying to perform skills and interest  Persons(s) to be included in education: patient (P)  Barriers to Learning/Limitations: None  Measures taken, if barriers to learning:    Patient Goal (s): \"To feel normal again. \"   Patient self reported health status: good  Rehabilitation Potential: good   Short Term Goals: To be accomplished in  2  weeks:  1. Patient will increase FOTO Score to 52 points to improve tolerance to ADLs/childcare. 2. Patient will achieve +2 on GROC to improve tolerance to ADLs/childcare. 3. Patient will report 4/10 pain at worst to improve tolerance to ADLs/childcare.  Long Term Goals: To be accomplished in  4  weeks:  1. Patient will increase FOTO Score to 60 points to improve tolerance to ADLs/childcare. 2. Patient will achieve +4 on GROC to improve tolerance to ADLs/childcare. 3. Patient will report 2/10 pain at worst to improve tolerance to ADLs/childcare.   Frequency / Duration:   Patient to be seen  2  times per week for 4  weeks:  Patient / Caregiver education and instruction: exercises  Therapist Signature: Kiran Quiroz PT Date: 77/85/9587   Certification Period: n/a Time: 4:24 PM ===========================================================================================  I certify that the above Physical Therapy Services are being furnished while the patient is under my care. I agree with the treatment plan and certify that this therapy is necessary. Physician Signature:        Date:       Time:                                        Ryan Lowe MD  Please sign and return to [facility] at [facility], [facility]. or you may fax the signed copy to [phone]. Thank you.

## 2021-10-25 NOTE — PROGRESS NOTES
PHYSICAL THERAPY - DAILY TREATMENT NOTE      Patient Name: Dick Quiñones        Date: 10/25/2021  : 1990   YES Patient  Verified  Visit #:     Insurance: Payor: BLUE CROSS MEDICAID / Plan: [facility] [employer] HEALTHKEEPERS PLUS / Product Type: Managed Care Medicaid /      In time: 3:30 Out time: 4:15   Total Treatment Time: 45     Medicare Time/BCBS Tracking (below)   Total Timed Codes (min):  10 1:1 Treatment Time:  10     TREATMENT AREA = Neck pain [M54.2]     SUBJECTIVE    Pain Level (on 0 to 10 scale):  8  / 10   Medication Changes/New allergies or changes in medical history, any new surgeries or procedures?     NO    If yes, update Summary List   Subjective Functional Status/Changes:  []  No changes reported       See Plan of Care       OBJECTIVE  Modalities Rationale:     decrease pain to improve patient's ability to perform ADLs/childcare   min [] Estim, type/location:                                      []  att     []  unatt     []  w/US     []  w/ice    []  w/heat    min []  Mechanical Traction: type/lbs                   []  pro   []  sup   []  int   []  cont    []  before manual    []  after manual    min []  Ultrasound, settings/location:      min []  Iontophoresis w/ dexamethasone, location:                                               []  take home patch       []  in clinic   10 min []  Ice     [x]  Heat    location/position: Supine cervical    min []  Vasopneumatic Device, press/temp:        min []  Other:    [x] Skin assessment post-treatment (if applicable):    []  intact    [x]  redness- no adverse reaction                  []redness - adverse reaction:        5 min Manual Therapy: Technique:      [] S/DTM []IASTM []PROM [] Passive Stretching   []manual TPR    []Jt manipulation:Gr I [] II []  III [] IV[] V[]  Treatment Area:  Prone DTm right midthoracic with PA mobs grade IV   Rationale:      increase ROM and increase tissue extensibility to improve patient's ability to perform ADLs/childcare    5 min Self Care:    Rationale:    increase ROM and increase strength to improve the patients ability to perform ADLs/childcare    Billed With/As:   [] TE   [] TA   [] Neuro   [x] Self Care Patient Education: [x] Review HEP    [] Progressed/Changed HEP based on:   [] positioning   [] body mechanics   [] transfers   [] heat/ice application    [] other:      Other Objective/Functional Measures:    See Plan of Care     Post Treatment Pain Level (on 0 to 10) scale:   8  / 10     ASSESSMENT    Assessment/Changes in Function:     See Plan of Care     []  See Progress Note/Recertification   Patient will continue to benefit from skilled PT services to modify and progress therapeutic interventions, address functional mobility deficits, address ROM deficits, address strength deficits, analyze and address soft tissue restrictions, analyze and cue movement patterns, analyze and modify body mechanics/ergonomics and assess and modify postural abnormalities to attain remaining goals. to attain remaining goals. Progress toward goals / Updated goals:    See Plan of Care     PLAN    [x]  Upgrade activities as tolerated YES Continue plan of care   []  Discharge due to :    []  Other:      Therapist: Seng Vicente, PT    Date: 10/25/2021 Time: 4:15 PM   No future appointments.

## 2021-10-27 ENCOUNTER — HOSPITAL ENCOUNTER (OUTPATIENT)
Dept: PHYSICAL THERAPY | Age: 31
Discharge: HOME | End: 2021-10-27
Payer: MEDICAID

## 2021-10-27 PROCEDURE — 97110 THERAPEUTIC EXERCISES: CPT | Performed by: PHYSICAL THERAPIST

## 2021-10-27 PROCEDURE — 97112 NEUROMUSCULAR REEDUCATION: CPT | Performed by: PHYSICAL THERAPIST

## 2021-10-27 PROCEDURE — 97535 SELF CARE MNGMENT TRAINING: CPT | Performed by: PHYSICAL THERAPIST

## 2021-10-27 NOTE — PROGRESS NOTES
[facility] PHYSICAL THERAPY - DAILY TREATMENT NOTE    Patient Name: Meenu Dinh        Date: 10/27/2021  : 1990   yes Patient  Verified  Visit #:   2   of   12  Insurance: Payor: BLUE CROSS MEDICAID / Plan: [facility] [employer] HEALTHKEEPERS PLUS / Product Type: Managed Care Medicaid /      In time: 282 Out time: 425   Total Treatment Time: 40     Medicare/BCBS Time Tracking (below)   Total Timed Codes (min):  40 1:1 Treatment Time:  40     TREATMENT AREA =  Neck pain [M54.2]    SUBJECTIVE  Pain Level (on 0 to 10 scale):  7  / 10   Medication Changes/New allergies or changes in medical history, any new surgeries or procedures?    no  If yes, update Summary List   Subjective Functional Status/Changes:  []  No changes reported     Felt good after the eval but it went back to before by the time I got back to my car         OBJECTIVE    10 min Therapeutic Exercise:  [x]  See flow sheet   Rationale:      increase ROM and increase strength to improve the patients ability to complet eadls     7 min Manual Therapy: R psoas release, ribs 1 depression, pa/ap ribs 2/3   Rationale:      decrease pain, increase ROM, increase tissue extensibility and decrease trigger points to improve patient's ability to complete adls  The manual therapy interventions were performed at a separate and distinct time from the therapeutic activities interventions.       10 min Neuromuscular Re-ed: [x]  See flow sheet   Rationale:    increase ROM and increase strength to improve the patients ability to complete adls      13 min Self Care: Positional awareness, hep, poc    Rationale:    increase ROM and increase strength to improve the patients ability to complete adls    Billed With/As:   [] TE   [] TA   [] Neuro   [] Self Care Patient Education: [x] Review HEP    [] Progressed/Changed HEP based on:   [] positioning   [] body mechanics   [] transfers   [] heat/ice application    [] other:      Other Objective/Functional Measures:    IR shoulder +pec minor, reduced ribs1-3 mobility, +R psoas tightness with apt  Prolonged cues for nmr for neutral spine awareness     Post Treatment Pain Level (on 0 to 10) scale:   6  / 10     ASSESSMENT  Assessment/Changes in Function:     Multiple biomechanical dysfunctions at multiple areas likely contributing to pain     []  See Progress Note/Recertification   Patient will continue to benefit from skilled PT services to modify and progress therapeutic interventions, address functional mobility deficits, address ROM deficits, address strength deficits, analyze and address soft tissue restrictions, analyze and cue movement patterns, analyze and modify body mechanics/ergonomics, assess and modify postural abnormalities and instruct in home and community integration to attain remaining goals.    Progress toward goals / Updated goals:    Given hep will assess compliance      PLAN  []  Upgrade activities as tolerated yes Continue plan of care   []  Discharge due to :    []  Other:      Therapist: Jody Escobar PT    Date: 10/27/2021 Time: 4:33 PM     Future Appointments   Date Time Provider Muna Chin   11/8/2021 11:00 AM [facility] 3 [facility]   11/10/2021  1:30 PM [facility] [address]   11/15/2021  1:30 PM [facility] 3 [facility]   11/17/2021  1:30 PM [facility] 3 [facility]   11/22/2021  5:15 PM [facility] [address]   11/24/2021  5:15 PM [facility] PT [facility] 3 [facility]   11/29/2021  1:30 PM [facility] PT [facility] [address]   12/1/2021  1:30 PM Cele Salmon

## 2021-11-08 ENCOUNTER — APPOINTMENT (OUTPATIENT)
Dept: PHYSICAL THERAPY | Age: 31
End: 2021-11-08
Payer: MEDICAID

## 2021-11-10 ENCOUNTER — APPOINTMENT (OUTPATIENT)
Dept: PHYSICAL THERAPY | Age: 31
End: 2021-11-10
Payer: MEDICAID

## 2021-11-15 ENCOUNTER — APPOINTMENT (OUTPATIENT)
Dept: PHYSICAL THERAPY | Age: 31
End: 2021-11-15
Payer: MEDICAID

## 2021-11-17 ENCOUNTER — APPOINTMENT (OUTPATIENT)
Dept: PHYSICAL THERAPY | Age: 31
End: 2021-11-17
Payer: MEDICAID

## 2021-11-22 ENCOUNTER — HOSPITAL ENCOUNTER (OUTPATIENT)
Dept: PHYSICAL THERAPY | Age: 31
Discharge: HOME | End: 2021-11-22
Payer: MEDICAID

## 2021-11-22 PROCEDURE — 97112 NEUROMUSCULAR REEDUCATION: CPT

## 2021-11-22 PROCEDURE — 97110 THERAPEUTIC EXERCISES: CPT

## 2021-11-22 NOTE — PROGRESS NOTES
PHYSICAL THERAPY - DAILY TREATMENT NOTE      Patient Name: Priscilla Johnson        Date: 2021  : 1990   YES Patient  Verified  Visit #:   3   of   10  Insurance: Payor: BLUE CROSS MEDICAID / Plan: [facility] [employer] HEALTHKEEPERS PLUS / Product Type: Managed Care Medicaid /      In time: 5:20 Out time: 6:00   Total Treatment Time: 40     Medicare/BCBS Time Tracking (below)   Total Timed Codes (min):  n/a 1:1 Treatment Time:  n/a     TREATMENT AREA =  Neck pain [M54.2]    SUBJECTIVE    Pain Level (on 0 to 10 scale):  7  / 10   Medication Changes/New allergies or changes in medical history, any new surgeries or procedures? NO    If yes, update Summary List   Subjective Functional Status/Changes:  []  No changes reported       Functional improvements: none reported  Functional impairments: pain with ADLs         OBJECTIVE    15 min Therapeutic Exercise:  [x]  See flow sheet   Rationale:      increase ROM and increase strength to improve the patients ability to perform ADLs     15 min Neuromuscular Re-ed:    Rationale:      increase ROM and increase strength to improve the patients ability to perform ADLs     10 min Manual Therapy: Technique:      [] S/DTM []IASTM []PROM [] Passive Stretching   []manual TPR    []Jt manipulation:Gr I [] II []  III [] IV[] V[]  Treatment Area:  Right psoas release and MET right inhalation rib correction; thoracic DTM and PA mobs grade IV; SOR, right lower cervical downglides   Rationale:      decrease pain, increase ROM and increase tissue extensibility to improve patient's ability to perform ADLs    Billed With/As:   [x] TE   [] TA   [] Neuro   [] Self Care Patient Education: [x] Review HEP    [] Progressed/Changed HEP based on:   [] positioning   [] body mechanics   [] transfers   [] heat/ice application    [] other:      Other Objective/Functional Measures:     Focused on improved pelvic alignment,  thoracic and tim mobility with decreased complaints of pain post-treatment. Post Treatment Pain Level (on 0 to 10) scale:   6  / 10     ASSESSMENT    Assessment/Changes in Function:     Patient reported improved pain after last session; however change in works schduel prevented attendance over last two weeks. []  See Progress Note/Recertification   Patient will continue to benefit from skilled PT services to modify and progress therapeutic interventions, address functional mobility deficits, address ROM deficits, address strength deficits, analyze and address soft tissue restrictions, analyze and cue movement patterns, analyze and modify body mechanics/ergonomics and assess and modify postural abnormalities to attain remaining goals. to attain remaining goals. Progress toward goals / Updated goals:    Slow Progress to    [] STG    [x] LTG  3 as shown by pain 7/10.      PLAN    [x]  Upgrade activities as tolerated YES Continue plan of care   []  Discharge due to :    []  Other:      Therapist: Deisy Rodriguez PT    Date: 11/22/2021 Time: 5:44 PM     Future Appointments   Date Time Provider Muna Chin   11/24/2021  5:15 PM [address] [place] [place] [address] [facility]   11/29/2021  5:15 PM [address] [address] [facility]   12/1/2021  5:15 PM [address] [place] [id number]

## 2021-11-24 ENCOUNTER — HOSPITAL ENCOUNTER (OUTPATIENT)
Dept: PHYSICAL THERAPY | Age: 31
Discharge: HOME | End: 2021-11-24
Payer: MEDICAID

## 2021-11-24 PROCEDURE — 97112 NEUROMUSCULAR REEDUCATION: CPT

## 2021-11-24 PROCEDURE — 97110 THERAPEUTIC EXERCISES: CPT

## 2021-11-24 NOTE — PROGRESS NOTES
PHYSICAL THERAPY - DAILY TREATMENT NOTE      Patient Name: Marily Glynn        Date: 2021  : 1990   YES Patient  Verified  Visit #:   4   of   8  Insurance: Payor: BLUE CROSS MEDICAID / Plan: [facility] [place] [employer] [facility] / Product Type: Managed Care Medicaid /      In time: 5:15 Out time: 5:55   Total Treatment Time: 40     Medicare/BCBS Time Tracking (below)   Total Timed Codes (min):  40 1:1 Treatment Time:  40     TREATMENT AREA =  Neck pain [M54.2]    SUBJECTIVE    Pain Level (on 0 to 10 scale):  7  / 10   Medication Changes/New allergies or changes in medical history, any new surgeries or procedures? NO    If yes, update Summary List   Subjective Functional Status/Changes:  []  No changes reported       Functional improvements: none reported  Functional impairments: pain with ADLs         OBJECTIVE    15 min Therapeutic Exercise:  [x]  See flow sheet   Rationale:      increase ROM and increase strength to improve the patients ability to perform ADLs     15 min Neuromuscular Re-ed:    Rationale:      increase ROM and increase strength to improve the patients ability to perform ADLs     10 min Manual Therapy: Technique:      [] S/DTM []IASTM []PROM [] Passive Stretching   []manual TPR    []Jt manipulation:Gr I [] II []  III [] IV[] V[]  Treatment Area:  SOR, DTM cervical paraspinals, right post cuff, pec; prone thoracic DTM, PA mobs grade IV   Rationale:      decrease pain, increase ROM and increase tissue extensibility to improve patient's ability to perform ADLs    Billed With/As:   [x] TE   [] TA   [] Neuro   [] Self Care Patient Education: [x] Review HEP    [] Progressed/Changed HEP based on:   [] positioning   [] body mechanics   [] transfers   [] heat/ice application    [] other:      Other Objective/Functional Measures:    Patient continues to present with increased tone and poor mobility lower cervical, upper thoracic.      Post Treatment Pain Level (on 0 to 10) scale: 6  / 10     ASSESSMENT    Assessment/Changes in Function:     Patient reports some symptom relief post-treatment, however without lasting benefit. []  See Progress Note/Recertification   Patient will continue to benefit from skilled PT services to modify and progress therapeutic interventions, address functional mobility deficits, address ROM deficits, address strength deficits, analyze and address soft tissue restrictions, analyze and cue movement patterns, analyze and modify body mechanics/ergonomics and assess and modify postural abnormalities to attain remaining goals. to attain remaining goals. Progress toward goals / Updated goals:    Fair Progress to    [] STG    [x] LTG  3 as shown by pain 7/10.      PLAN    [x]  Upgrade activities as tolerated YES Continue plan of care   []  Discharge due to :    []  Other:      Therapist: Nafisa Pryor PT    Date: 11/24/2021 Time: 5:39 PM     Future Appointments   Date Time Provider Muna Chin   11/29/2021  5:15 PM [address] [facility]   12/1/2021  5:15 PM [address] [facility] [address] [facility]   12/13/2021  9:15 AM Sandra Moody, PT [facility]   12/16/2021  9:45 AM Sandra Moody PT [facility]   12/21/2021  6:00 PM Sandra Moody PT [facility]   12/23/2021  9:15 AM Lucy Peterson, PT [id number]

## 2021-11-29 ENCOUNTER — HOSPITAL ENCOUNTER (OUTPATIENT)
Dept: PHYSICAL THERAPY | Age: 31
Discharge: HOME | End: 2021-11-29
Payer: MEDICAID

## 2021-11-29 PROCEDURE — 97112 NEUROMUSCULAR REEDUCATION: CPT

## 2021-11-29 PROCEDURE — 97110 THERAPEUTIC EXERCISES: CPT

## 2021-11-29 PROCEDURE — 97535 SELF CARE MNGMENT TRAINING: CPT

## 2021-11-29 NOTE — PROGRESS NOTES
[address] [facility] THERAPY  [address] [facility] [address],[facility], [address] - Phone: [phone]  Fax: [phone]  PROGRESS NOTE  Patient Name: Shana Kuhn : 1990   Treatment/Medical Diagnosis: Neck pain [M54.2]   Referral Source: Nancy Grant MD     Date of Initial Visit: 10/25/21 Attended Visits: 5 Missed Visits: 1     SUMMARY OF TREATMENT  Treatment focused on improved spinal mobility and postural stability to reduce pain. CURRENT STATUS  Patient reporting slow progress. Persistent cervical pain right>left 6/10 on average with increased symptoms as the day progresses. Patient reporting +1 on GROC \"indicating a tiny bit better. \" FOTO Score decreased from 44 points to 39 points. Plan to continue to progress mobility and stability therex. Goal/Measure of Progress Goal Met? 1.  FOTO Score 60 points   Status at last Eval: 44 points Current Status: 39 points no   2. GROC +4   Status at last Eval: n/a Current Status: GROC +1 no   3. Pain 2/10 at worst   Status at last Eval: 10/10 at worst Current Status: 8/10 at worst no     New Goals to be achieved in __4__  weeks:  1. Patient will increase FOTO Score to 60 points to improve tolerance to ADLs/childcare. 2. Patient will achieve +4 on GROC to improve tolerance to ADLs/childcare. 3. Patient will report 2/10 pain at worst to improve tolerance to ADLs/childcare. RECOMMENDATIONS  Recommend continued PT 2 x week x 4 weeks to further improve cervical pain, spinals mobility and postural stability. If you have any questions/comments please contact us directly at [phone]. Thank you for allowing us to assist in the care of your patient.     Therapist Signature: Waldo Velasquez PT Date: 2021     Time: 6:27 PM   NOTE TO PHYSICIAN:  PLEASE COMPLETE THE ORDERS BELOW AND FAX TO   [facility]: [phone]  If you are unable to process this request in 24 hours please contact our office: [phone]    ___ I have read the above report and request that my patient continue as recommended.   ___ I have read the above report and request that my patient continue therapy with the following changes/special instructions:_________________________________________________________   ___ I have read the above report and request that my patient be discharged from therapy.      Physician Signature:        Date:       Time:                                 Ana Michelle MD

## 2021-11-29 NOTE — PROGRESS NOTES
PHYSICAL THERAPY - DAILY TREATMENT NOTE      Patient Name: Nick Palacios        Date: 2021  : 1990   YES Patient  Verified  Visit #:   5   of   8  Insurance: Payor: BLUE CROSS MEDICAID / [facility]: [facility]KEEPERS PLUS / Product Type: Managed Care Medicaid /      In time: 5:15 Out time: 6:00   Total Treatment Time: 45     Medicare/BCBS Time Tracking (below)   Total Timed Codes (min):  45 1:1 Treatment Time:  45     TREATMENT AREA =  Neck pain [M54.2]    SUBJECTIVE    Pain Level (on 0 to 10 scale):  6  / 10   Medication Changes/New allergies or changes in medical history, any new surgeries or procedures?     NO    If yes, update Summary List   Subjective Functional Status/Changes:  []  No changes reported       See PN         OBJECTIVE    10 min Therapeutic Exercise:  [x]  See flow sheet   Rationale:      increase ROM and increase strength to improve the patients ability to perform ADLs     10 min Neuromuscular Re-ed:    Rationale:      increase ROM and increase strength to improve the patients ability to perform ADLs     10 min Manual Therapy: Technique:      [] S/DTM []IASTM []PROM [] Passive Stretching   []manual TPR    []Jt manipulation:Gr I [] II []  III [] IV[] V[]  Treatment Area:  SOR, DTM lower cervical paraspinals; right first rib depression; prone bilateral upper rib depression with anterior mobs grade IV; manual traction; MET correction left ant/right post pelvic tilt; bilateral psoas release   Rationale:      decrease pain, increase ROM and increase tissue extensibility to improve patient's ability to perform ADLs    10 min Self Care:    Rationale:    increase ROM and increase strength to improve the patients ability to perform ADLs    Billed With/As:   [x] TE   [] TA   [] Neuro   [] Self Care Patient Education: [x] Review HEP    [] Progressed/Changed HEP based on:   [] positioning   [] body mechanics   [] transfers   [] heat/ice application    [] other:      Other Objective/Functional Measures:    See PN     Post Treatment Pain Level (on 0 to 10) scale:   5  / 10     ASSESSMENT    Assessment/Changes in Function:     See PN     []  See Progress Note/Recertification   Patient will continue to benefit from skilled PT services to modify and progress therapeutic interventions, address functional mobility deficits, address ROM deficits, address strength deficits, analyze and address soft tissue restrictions, analyze and cue movement patterns, analyze and modify body mechanics/ergonomics and assess and modify postural abnormalities to attain remaining goals. to attain remaining goals.    Progress Toward Goals:    See PN     PLAN    [x]  Upgrade activities as tolerated YES Continue plan of care   []  Discharge due to :    []  Other:      Therapist: Christo Abbott PT    Date: 11/29/2021 Time: 6:20 PM     Future Appointments   Date Time Provider Muna Chin   12/1/2021  5:15 PM [address] [facility] [address] [facility]   12/13/2021  9:15 AM June Beard PT [facility]   12/16/2021  9:45 AM June Beard PT [facility]   12/21/2021  6:00 PM June Beard PT [facility]   12/23/2021  9:15 AM Jatinder Peterson, PT [id number]

## 2021-12-01 ENCOUNTER — HOSPITAL ENCOUNTER (OUTPATIENT)
Dept: PHYSICAL THERAPY | Age: 31
Discharge: HOME | End: 2021-12-01
Payer: MEDICAID

## 2021-12-01 PROCEDURE — 97112 NEUROMUSCULAR REEDUCATION: CPT

## 2021-12-01 PROCEDURE — 97535 SELF CARE MNGMENT TRAINING: CPT

## 2021-12-01 PROCEDURE — 97110 THERAPEUTIC EXERCISES: CPT

## 2021-12-01 NOTE — PROGRESS NOTES
PHYSICAL THERAPY - DAILY TREATMENT NOTE      Patient Name: Jackie Allison        Date: 2021  : 1990   YES Patient  Verified  Visit #:   6   of   8  Insurance: Payor: BLUE CROSS MEDICAID / Plan: [facility] [employer] [facility]K[facility]ERS [facility]US / Product Type: Managed Care Medicaid /      In time: 5:15 Out time: 6:00   Total Treatment Time: 45     Medicare/BCBS Time Tracking (below)   Total Timed Codes (min):  45 1:1 Treatment Time:  45     TREATMENT AREA =  Neck pain [M54.2]    SUBJECTIVE    Pain Level (on 0 to 10 scale):  7  / 10   Medication Changes/New allergies or changes in medical history, any new surgeries or procedures?     NO    If yes, update Summary List   Subjective Functional Status/Changes:  []  No changes reported       Functional improvements: none reported  Functional impairments: pain with ADLs         OBJECTIVE    10 min Therapeutic Exercise:  [x]  See flow sheet   Rationale:      increase ROM and increase strength to improve the patients ability to perform ADLs     10 min Neuromuscular Re-ed:    Rationale:      increase ROM and increase strength to improve the patients ability to perform ADLs     10 min Manual Therapy: Technique:      [] S/DTM []IASTM []PROM [] Passive Stretching   []manual TPR    []Jt manipulation:Gr I [] II []  III [] IV[] V[]  Treatment Area:  Pelvis level; MET correction left T12 rotation and left sacral torsion; prone t/s DTM paraspinals and PA mobs grade IV; SOR, upglides bilateral lower cervical spine   Rationale:      decrease pain, increase ROM and increase tissue extensibility to improve patient's ability to perform ADLs    10 min Self Care:     Rationale:    increase ROM and increase strength to improve the patients ability to perform ADLs    Billed With/As:   [x] TE   [] TA   [] Neuro   [] Self Care Patient Education: [x] Review HEP    [] Progressed/Changed HEP based on:   [] positioning   [] body mechanics   [] transfers   [] heat/ice application    [] other: Other Objective/Functional Measures:    Patient continuing to report right>left UE \"tightness. \"  Focused on postural education for rib/oblique engagement in sitting and standing,     Post Treatment Pain Level (on 0 to 10) scale:   6  / 10     ASSESSMENT    Assessment/Changes in Function:     Increased sleep disturbance reported-educated in use of pillows between knees and proper in front for top arm to rest in sidelying. []  See Progress Note/Recertification   Patient will continue to benefit from skilled PT services to modify and progress therapeutic interventions, address functional mobility deficits, address ROM deficits, address strength deficits, analyze and address soft tissue restrictions, analyze and cue movement patterns, analyze and modify body mechanics/ergonomics and assess and modify postural abnormalities to attain remaining goals. to attain remaining goals. Progress toward goals / Updated goals:    Slow Progress to    [] STG    [x] LTG  3 as shown by pain 7/10.      PLAN    [x]  Upgrade activities as tolerated YES Continue plan of care   []  Discharge due to :    []  Other:      Therapist: Aric Fischer PT    Date: 12/1/2021 Time: 5:42 PM     Future Appointments   Date Time Provider Muna Chin   12/13/2021  9:15 AM Sergio Ambrocio, PT [facility]   12/16/2021  9:45 AM Sergio Ambrocio PT [facility]   12/21/2021  6:00 PM Sergio Ambrocio PT [facility]   12/23/2021  9:15 AM Taya Peterson, PT [id number]

## 2021-12-13 ENCOUNTER — HOSPITAL ENCOUNTER (OUTPATIENT)
Dept: PHYSICAL THERAPY | Age: 31
Discharge: HOME | End: 2021-12-13
Payer: MEDICAID

## 2021-12-13 PROCEDURE — 97112 NEUROMUSCULAR REEDUCATION: CPT | Performed by: PHYSICAL THERAPIST

## 2021-12-13 PROCEDURE — 97110 THERAPEUTIC EXERCISES: CPT | Performed by: PHYSICAL THERAPIST

## 2021-12-13 NOTE — PROGRESS NOTES
[facility] PHYSICAL THERAPY - DAILY TREATMENT NOTE    Patient Name: Jennifer Ferreira        Date: 2021  : 1990   yes Patient  Verified  Visit #:     Insurance: Payor: [id number] [employer] / Plan: [facility] [employer]KEEPERS PLUS / Product Type: Managed Care Medicaid /      In time: 024 Out time: 1000   Total Treatment Time: 45     Medicare/BCBS Time Tracking (below)   Total Timed Codes (min):  45 1:1 Treatment Time:  45     TREATMENT AREA =  Neck pain [M54.2]    SUBJECTIVE  Pain Level (on 0 to 10 scale):  7  / 10   Medication Changes/New allergies or changes in medical history, any new surgeries or procedures?    no  If yes, update Summary List   Subjective Functional Status/Changes:  []  No changes reported     To be honest with you I see no difference with therapy. I still feel like I cannot reach up and out on this R side           OBJECTIVE    20 min Therapeutic Exercise:  [x]  See flow sheet   Rationale:      increase ROM, increase strength and improve coordination to improve the patients ability to complete adls     5 min Manual Therapy: R psoas release, rib mobs 1-3 ap/pa, inferior depression    Rationale:      decrease pain, increase ROM, increase tissue extensibility and decrease trigger points to improve patient's ability to complete adls  The manual therapy interventions were performed at a separate and distinct time from the therapeutic activities interventions.     20 min Neuromuscular Re-ed: [x]  See flow sheet   Rationale:    increase strength and increase proprioception to improve the patients ability to transfer       Billed With/As:   [] TE   [] TA   [] Neuro   [] Self Care Patient Education: [x] Review HEP    [] Progressed/Changed HEP based on:   [] positioning   [] body mechanics   [] transfers   [] heat/ice application    [] other:      Other Objective/Functional Measures:    Te/nmr as per flow sheet - still unable to achieve any R sided rib expansion, ttp along R psoas  Required cues for diaphragm breathing as still performing with excessive mm      Post Treatment Pain Level (on 0 to 10) scale:   7  / 10     ASSESSMENT  Assessment/Changes in Function:     No increase in pain following session but advised on doms     []  See Progress Note/Recertification   Patient will continue to benefit from skilled PT services to modify and progress therapeutic interventions, address functional mobility deficits, address ROM deficits, address strength deficits, analyze and address soft tissue restrictions, analyze and cue movement patterns, analyze and modify body mechanics/ergonomics, assess and modify postural abnormalities and instruct in home and community integration to attain remaining goals. Progress toward goals / Updated goals:     Will attempt more rib mobility next several sessions to address continued pain and rom dysfunctions      PLAN  []  Upgrade activities as tolerated yes Continue plan of care   []  Discharge due to :    []  Other:      Therapist: Toño Mac, PT    Date: 12/13/2021 Time: 7:01 AM     Future Appointments   Date Time Provider Muna Chin   12/13/2021  9:15 AM Gume Hoffman, PT [id number]   12/16/2021  9:45 AM Gume Hoffman PT [facility]   12/21/2021  6:00 PM Gume Hoffman PT [facility]   12/23/2021  9:15 AM Salty Peterson, PT [id number]

## 2021-12-16 ENCOUNTER — HOSPITAL ENCOUNTER (OUTPATIENT)
Dept: PHYSICAL THERAPY | Age: 31
Discharge: HOME | End: 2021-12-16
Payer: MEDICAID

## 2021-12-16 PROCEDURE — 97112 NEUROMUSCULAR REEDUCATION: CPT | Performed by: PHYSICAL THERAPIST

## 2021-12-16 NOTE — PROGRESS NOTES
[facility] PHYSICAL THERAPY - DAILY TREATMENT NOTE    Patient Name: Keyla Martinez        Date: 2021  : 1990   yes Patient  Verified  Visit #:   8   of   13  Insurance: Payor: [employer] / Plan: [facility] HEALTHKEEPERS PLUS / Product Type: Managed Care Medicaid /      In time: 606 Out time: 5231   Total Treatment Time: 30     Medicare/BCBS Time Tracking (below)   Total Timed Codes (min):  30 1:1 Treatment Time:  30     TREATMENT AREA =  Neck pain [M54.2]    SUBJECTIVE  Pain Level (on 0 to 10 scale):  7  / 10   Medication Changes/New allergies or changes in medical history, any new surgeries or procedures?    no  If yes, update Summary List   Subjective Functional Status/Changes:  []  No changes reported       I hung up a shower curtain and it took me awhile so it was killing me. I felt it all in my neck and upper back area        OBJECTIVE      7 min Manual Therapy: r rib 2-4 pa, ribs 7-19 inward roll,    Rationale:      decrease pain, increase ROM, increase tissue extensibility and decrease trigger points to improve patient's ability to complete adls  The manual therapy interventions were performed at a separate and distinct time from the therapeutic activities interventions.     23 min Neuromuscular Re-ed: [x]  See flow sheet   Rationale:    increase strength and increase proprioception to improve the patients ability to transfer       Billed With/As:   [] TE   [] TA   [] Neuro   [] Self Care Patient Education: [x] Review HEP    [] Progressed/Changed HEP based on:   [] positioning   [] body mechanics   [] transfers   [] heat/ice application    [] other:      Other Objective/Functional Measures:  Significant loss of thoracic rotation b >75% with pain evie gct and tl junction, R rib 7 in noted outflare      Post Treatment Pain Level (on 0 to 10) scale:    10     ASSESSMENT  Assessment/Changes in Function:     Compensatory neck mm is suspected to achieve B overhead motions     []  See Progress Note/Recertification   Patient will continue to benefit from skilled PT services to modify and progress therapeutic interventions, address functional mobility deficits, address ROM deficits, address strength deficits, analyze and address soft tissue restrictions, analyze and cue movement patterns, analyze and modify body mechanics/ergonomics, assess and modify postural abnormalities and instruct in home and community integration to attain remaining goals.    Progress toward goals / Updated goals:    Slow progress with pain reduction      PLAN  []  Upgrade activities as tolerated yes Continue plan of care   []  Discharge due to :    []  Other:      Therapist: Sariah Underwood PT    Date: 12/16/2021 Time: 7:01 AM     Future Appointments   Date Time Provider Muna Chin   12/16/2021  9:45 AM Arlene Mosher, PT [address]   12/21/2021  6:00 PM Arlene Mosher, PT [facility]   12/23/2021  9:15 AM Adal Peterson, PT [facility]

## 2021-12-21 ENCOUNTER — TELEPHONE (OUTPATIENT)
Dept: PHYSICAL THERAPY | Age: 31
End: 2021-12-21

## 2021-12-21 ENCOUNTER — APPOINTMENT (OUTPATIENT)
Dept: PHYSICAL THERAPY | Age: 31
End: 2021-12-21
Payer: MEDICAID

## 2021-12-22 ENCOUNTER — HOSPITAL ENCOUNTER (OUTPATIENT)
Dept: PHYSICAL THERAPY | Age: 31
Discharge: HOME | End: 2021-12-22
Payer: MEDICAID

## 2021-12-22 PROCEDURE — 97112 NEUROMUSCULAR REEDUCATION: CPT | Performed by: PHYSICAL THERAPIST

## 2021-12-22 PROCEDURE — 97110 THERAPEUTIC EXERCISES: CPT | Performed by: PHYSICAL THERAPIST

## 2021-12-22 NOTE — PROGRESS NOTES
[facility] PHYSICAL THERAPY - DAILY TREATMENT NOTE    Patient Name: Anita Cao        Date: 2021  : 1990   yes Patient  Verified  Visit #:      of   13  Insurance: Payor: [employer] / Plan: [facility] HEALTHKEEPERS PLUS / Product Type: Managed Care Medicaid /      In time: 400 Out time: 445   Total Treatment Time: 45     Medicare/BCBS Time Tracking (below)   Total Timed Codes (min):  45 1:1 Treatment Time:  45     TREATMENT AREA =  Cervicalgia [M54.2]    SUBJECTIVE  Pain Level (on 0 to 10 scale):  7  / 10   Medication Changes/New allergies or changes in medical history, any new surgeries or procedures?    no  If yes, update Summary List   Subjective Functional Status/Changes:  []  No changes reported     I do well after working with you but then I do not get in so I just feel like it goes right back to square one        OBJECTIVE  18 min Therapeutic exercises  [x]  See flow sheet   Rationale:    increase strength and increase proprioception to improve the patients ability to transfer       7 min Manual Therapy: r rib 2-4 pa, ribs 7-19 inward roll,    Rationale:      decrease pain, increase ROM, increase tissue extensibility and decrease trigger points to improve patient's ability to complete adls  The manual therapy interventions were performed at a separate and distinct time from the therapeutic activities interventions.     20 min Neuromuscular Re-ed: [x]  See flow sheet   Rationale:    increase strength and increase proprioception to improve the patients ability to transfer       Billed With/As:   [] TE   [] TA   [] Neuro   [] Self Care Patient Education: [x] Review HEP    [] Progressed/Changed HEP based on:   [] positioning   [] body mechanics   [] transfers   [] heat/ice application    [] other:      Other Objective/Functional Measures:  Significant loss of thoracic rotation b >75% with pain evie gct and tl junction, R rib 7 in noted outflare   +R psoas and rf tightness    Post Treatment Pain Level (on 0 to 10) scale:   6 / 10     ASSESSMENT  Assessment/Changes in Function:     No increase in pain following session      []  See Progress Note/Recertification   Patient will continue to benefit from skilled PT services to modify and progress therapeutic interventions, address functional mobility deficits, address ROM deficits, address strength deficits, analyze and address soft tissue restrictions, analyze and cue movement patterns, analyze and modify body mechanics/ergonomics, assess and modify postural abnormalities and instruct in home and community integration to attain remaining goals.    Progress toward goals / Updated goals:    Pt to attend more consistently which will help with consistent progress with pain control      PLAN  []  Upgrade activities as tolerated yes Continue plan of care   []  Discharge due to :    []  Other:      Therapist: Olivia Fisher, PT    Date: 12/22/2021 Time: 7:01 AM     Future Appointments   Date Time Provider Muna Chin   1/12/2022  5:15 PM Alma Delia Medley,  [address] [facility]   1/14/2022  8:00 AM Dalila Peterson, PT [id number]

## 2021-12-23 ENCOUNTER — APPOINTMENT (OUTPATIENT)
Dept: PHYSICAL THERAPY | Age: 31
End: 2021-12-23
Payer: MEDICAID

## 2022-01-04 ENCOUNTER — APPOINTMENT (OUTPATIENT)
Dept: PHYSICAL THERAPY | Age: 32
End: 2022-01-04
Payer: MEDICAID

## 2022-01-10 ENCOUNTER — TELEPHONE (OUTPATIENT)
Dept: PHYSICAL THERAPY | Age: 32
End: 2022-01-10

## 2022-01-10 NOTE — TELEPHONE ENCOUNTER
Pt wanted to cxl appt for this week advised pt would need to see them before 1/22 in order to stay in 30 day window pt stated they will call back to get rs

## 2022-01-11 ENCOUNTER — HOSPITAL ENCOUNTER (OUTPATIENT)
Dept: PHYSICAL THERAPY | Age: 32
Discharge: HOME | End: 2022-01-11
Payer: MEDICAID

## 2022-01-11 PROCEDURE — 97110 THERAPEUTIC EXERCISES: CPT | Performed by: PHYSICAL THERAPIST

## 2022-01-11 PROCEDURE — 97112 NEUROMUSCULAR REEDUCATION: CPT | Performed by: PHYSICAL THERAPIST

## 2022-01-11 NOTE — PROGRESS NOTES
[facility] PHYSICAL THERAPY - DAILY TREATMENT NOTE    Patient Name: Darnell Mishra        Date: 2022  : 1990   yes Patient  Verified  Visit #:   10   of   18  Insurance: Payor: Boby Cage / Plan: Sana Littlejohn PLUS / Product Type: Managed Care Medicaid /      In time: 059 Out time: 928   Total Treatment Time: 35     Medicare/BCBS Time Tracking (below)   Total Timed Codes (min):  35 1:1 Treatment Time:  35     TREATMENT AREA =  Cervicalgia [M54.2]    SUBJECTIVE  Pain Level (on 0 to 10 scale):  6 / 10   Medication Changes/New allergies or changes in medical history, any new surgeries or procedures?    no  If yes, update Summary List   Subjective Functional Status/Changes:  []  No changes reported     I actually was not hurting all the time after last session. Once again with scheduling I could not get in so now it is hurting again     OBJECTIVE  13 min Therapeutic exercises  [x]  See flow sheet   Rationale:    increase strength and increase proprioception to improve the patients ability to transfer       7 min Manual Therapy: r rib 2-4 pa, ribs 7-19 inward roll,    Rationale:      decrease pain, increase ROM, increase tissue extensibility and decrease trigger points to improve patient's ability to complete adls  The manual therapy interventions were performed at a separate and distinct time from the therapeutic activities interventions.     15 min Neuromuscular Re-ed: [x]  See flow sheet   Rationale:    increase strength and increase proprioception to improve the patients ability to transfer       Billed With/As:   [] TE   [] TA   [] Neuro   [] Self Care Patient Education: [x] Review HEP    [] Progressed/Changed HEP based on:   [] positioning   [] body mechanics   [] transfers   [] heat/ice application    [] other:      Other Objective/Functional Measures:  Significant loss of thoracic rotation L>75% with pain evie gct and tl junction, RR 50%, R rib 7 in noted outflare   +R psoas and rf tightness, reduced posterior pelvic tilt noted    Post Treatment Pain Level (on 0 to 10) scale:   6 / 10     ASSESSMENT  Assessment/Changes in Function:     No increase in pain following session      []  See Progress Note/Recertification   Patient will continue to benefit from skilled PT services to modify and progress therapeutic interventions, address functional mobility deficits, address ROM deficits, address strength deficits, analyze and address soft tissue restrictions, analyze and cue movement patterns, analyze and modify body mechanics/ergonomics, assess and modify postural abnormalities and instruct in home and community integration to attain remaining goals. Progress toward goals / Updated goals:    Consistent attendance has been major limitation and pain control.       PLAN  []  Upgrade activities as tolerated yes Continue plan of care   []  Discharge due to :    []  Other:      Therapist: Vipin Royal, PT    Date: 1/11/2022 Time: 7:01 AM     Future Appointments   Date Time Provider Muna Chin   1/25/2022 10:15 AM Shayan Miller,  [address] [facility]   1/28/2022  9:30 AM Lurdes Peterson, PT [id number]

## 2022-01-12 ENCOUNTER — APPOINTMENT (OUTPATIENT)
Dept: PHYSICAL THERAPY | Age: 32
End: 2022-01-12
Payer: MEDICAID

## 2022-01-13 ENCOUNTER — HOSPITAL ENCOUNTER (OUTPATIENT)
Dept: PHYSICAL THERAPY | Age: 32
Discharge: HOME | End: 2022-01-13
Payer: MEDICAID

## 2022-01-13 PROCEDURE — 97112 NEUROMUSCULAR REEDUCATION: CPT | Performed by: PHYSICAL THERAPIST

## 2022-01-13 PROCEDURE — 97110 THERAPEUTIC EXERCISES: CPT | Performed by: PHYSICAL THERAPIST

## 2022-01-14 ENCOUNTER — APPOINTMENT (OUTPATIENT)
Dept: PHYSICAL THERAPY | Age: 32
End: 2022-01-14
Payer: MEDICAID

## 2022-01-18 ENCOUNTER — HOSPITAL ENCOUNTER (OUTPATIENT)
Dept: PHYSICAL THERAPY | Age: 32
Discharge: HOME | End: 2022-01-18
Payer: MEDICAID

## 2022-01-18 PROCEDURE — 97110 THERAPEUTIC EXERCISES: CPT | Performed by: PHYSICAL THERAPIST

## 2022-01-18 PROCEDURE — 97112 NEUROMUSCULAR REEDUCATION: CPT | Performed by: PHYSICAL THERAPIST

## 2022-01-18 NOTE — PROGRESS NOTES
[facility] PHYSICAL THERAPY - DAILY TREATMENT NOTE    Patient Name: Blair Casas        Date: 2022  : 1990   yes Patient  Verified  Visit #:     Insurance: Payor: Fabian Mexican / Plan: [id number] [place] [address] / Product Type: Managed Care Medicaid /      In time: 898 Out time: 138   Total Treatment Time: 32     Medicare/BCBS Time Tracking (below)   Total Timed Codes (min):  32 1:1 Treatment Time:  32     TREATMENT AREA =  Cervicalgia [M54.2]    SUBJECTIVE  Pain Level (on 0 to 10 scale):  4 / 10   Medication Changes/New allergies or changes in medical history, any new surgeries or procedures?    no  If yes, update Summary List   Subjective Functional Status/Changes:  []  No changes reported   I was able to lift my sahra car carrier without pain for first time   OBJECTIVE  10 min Therapeutic exercises  [x]  See flow sheet   Rationale:    increase strength and increase proprioception to improve the patients ability to transfer       7 min Manual Therapy: r rib 2-4 pa, ribs 7-19 inward roll,    Rationale:      decrease pain, increase ROM, increase tissue extensibility and decrease trigger points to improve patient's ability to complete adls  The manual therapy interventions were performed at a separate and distinct time from the therapeutic activities interventions.     15 min Neuromuscular Re-ed: [x]  See flow sheet   Rationale:    increase strength and increase proprioception to improve the patients ability to transfer       Billed With/As:   [] TE   [] TA   [] Neuro   [] Self Care Patient Education: [x] Review HEP    [] Progressed/Changed HEP based on:   [] positioning   [] body mechanics   [] transfers   [] heat/ice application    [] other:      Other Objective/Functional Measures:  Repeated similar session as previous as pt reporting improved pain levels with increased activity   Reduced mm tension along R paraspinals    Post Treatment Pain Level (on 0 to 10) scale:   3 / 10     ASSESSMENT  Assessment/Changes in Function:     No increase in pain following session      []  See Progress Note/Recertification   Patient will continue to benefit from skilled PT services to modify and progress therapeutic interventions, address functional mobility deficits, address ROM deficits, address strength deficits, analyze and address soft tissue restrictions, analyze and cue movement patterns, analyze and modify body mechanics/ergonomics, assess and modify postural abnormalities and instruct in home and community integration to attain remaining goals.    Progress toward goals / Updated goals:    Progress with pain reduction since attempting therapy on a regular basis      PLAN  []  Upgrade activities as tolerated yes Continue plan of care   []  Discharge due to :    []  Other:      Therapist: Gutierrez Flores, PT    Date: 1/18/2022 Time: 7:01 AM     Future Appointments   Date Time Provider Muna Chin   1/25/2022 10:15 AM July Abernathy,  [address] [facility]   1/28/2022  9:30 AM Krista Peterson, PT [id number]

## 2022-01-20 ENCOUNTER — HOSPITAL ENCOUNTER (OUTPATIENT)
Dept: PHYSICAL THERAPY | Age: 32
Discharge: HOME | End: 2022-01-20
Payer: MEDICAID

## 2022-01-20 ENCOUNTER — APPOINTMENT (OUTPATIENT)
Dept: PHYSICAL THERAPY | Age: 32
End: 2022-01-20
Payer: MEDICAID

## 2022-01-20 PROCEDURE — 97110 THERAPEUTIC EXERCISES: CPT | Performed by: PHYSICAL THERAPIST

## 2022-01-20 PROCEDURE — 97112 NEUROMUSCULAR REEDUCATION: CPT | Performed by: PHYSICAL THERAPIST

## 2022-01-20 NOTE — PROGRESS NOTES
[facility] PHYSICAL THERAPY - DAILY TREATMENT NOTE    Patient Name: Edelmira Choudhury        Date: 2022  : 1990   yes Patient  Verified  Visit #:     Insurance: Payor: Yoseph Cuellar / Plan: [facility] [place] [employer] [place] [employer] PLUS / Product Type: Managed Care Medicaid /      In time: 255 Out time: 886   Total Treatment Time: 46     Medicare/BCBS Time Tracking (below)   Total Timed Codes (min):  46 1:1 Treatment Time:  46     TREATMENT AREA =  Cervicalgia [M54.2]    SUBJECTIVE  Pain Level (on 0 to 10 scale):  4 / 10   Medication Changes/New allergies or changes in medical history, any new surgeries or procedures?    no  If yes, update Summary List   Subjective Functional Status/Changes:  []  No changes reported   I feel tight in  My front area    OBJECTIVE  20 min Therapeutic exercises  [x]  See flow sheet   Rationale:    increase strength and increase proprioception to improve the patients ability to transfer       7 min Manual Therapy: r rib 2-4 pa, ribs 7-19 inward roll,    Rationale:      decrease pain, increase ROM, increase tissue extensibility and decrease trigger points to improve patient's ability to complete adls  The manual therapy interventions were performed at a separate and distinct time from the therapeutic activities interventions.     19 min Neuromuscular Re-ed: [x]  See flow sheet   Rationale:    increase strength and increase proprioception to improve the patients ability to transfer       Billed With/As:   [] TE   [] TA   [] Neuro   [] Self Care Patient Education: [x] Review HEP    [] Progressed/Changed HEP based on:   [] positioning   [] body mechanics   [] transfers   [] heat/ice application    [] other:      Other Objective/Functional Measures:  Progressed te/nmr per flow sheet - still requires cues for appropriate rib expansion during extremity motions   Post Treatment Pain Level (on 0 to 10) scale:   0 / 10     ASSESSMENT  Assessment/Changes in Function:     Left pain free for first time since the start of therapy      []  See Progress Note/Recertification   Patient will continue to benefit from skilled PT services to modify and progress therapeutic interventions, address functional mobility deficits, address ROM deficits, address strength deficits, analyze and address soft tissue restrictions, analyze and cue movement patterns, analyze and modify body mechanics/ergonomics, assess and modify postural abnormalities and instruct in home and community integration to attain remaining goals.    Progress toward goals / Updated goals:    Progress with pain reduction since attempting therapy on a regular basis      PLAN  []  Upgrade activities as tolerated yes Continue plan of care   []  Discharge due to :    []  Other:      Therapist: Azar Clement, PT    Date: 1/20/2022 Time: 7:01 AM     Future Appointments   Date Time Provider Muna Chin   1/25/2022 10:15 AM Samuel Pope,  [address] [facility]   1/28/2022  9:30 AM Jose Peterson, PT [id number]

## 2022-01-25 ENCOUNTER — APPOINTMENT (OUTPATIENT)
Dept: PHYSICAL THERAPY | Age: 32
End: 2022-01-25
Payer: MEDICAID

## 2022-01-26 ENCOUNTER — HOSPITAL ENCOUNTER (OUTPATIENT)
Dept: PHYSICAL THERAPY | Age: 32
Discharge: HOME | End: 2022-01-26
Payer: MEDICAID

## 2022-01-26 PROCEDURE — 97112 NEUROMUSCULAR REEDUCATION: CPT | Performed by: PHYSICAL THERAPIST

## 2022-01-26 PROCEDURE — 97110 THERAPEUTIC EXERCISES: CPT | Performed by: PHYSICAL THERAPIST

## 2022-01-26 NOTE — PROGRESS NOTES
[facility] PHYSICAL THERAPY - DAILY TREATMENT NOTE    Patient Name: Josselyn Rosales        Date: 2022  : 1990   yes Patient  Verified  Visit #:   15  of   18  Insurance: Payor: [employer] / Plan: [facility] [employer] PLUS / Product Type: Managed Care Medicaid /      In time: 1100 Out time: 8831   Total Treatment Time: 37     Medicare/BCBS Time Tracking (below)   Total Timed Codes (min):  37 1:1 Treatment Time:  37      TREATMENT AREA =  Cervicalgia [M54.2]    SUBJECTIVE  Pain Level (on 0 to 10 scale):  3 / 10   Medication Changes/New allergies or changes in medical history, any new surgeries or procedures?    no  If yes, update Summary List   Subjective Functional Status/Changes:  _  No changes reported   I am not sure if it is my braces or my jaw but I have had a ha for the last several days - I took 4 covid tests and they wree negative    OBJECTIVE  20 min Therapeutic exercises  [x]  See flow sheet   Rationale:    increase strength and increase proprioception to improve the patients ability to transfer       7 min Manual Therapy: [place], sor   Rationale:      decrease pain, increase ROM, increase tissue extensibility and decrease trigger points to improve patient's ability to complete adls  The manual therapy interventions were performed at a separate and distinct time from the therapeutic activities interventions.     10 min Neuromuscular Re-ed: [x]  See flow sheet   Rationale:    increase strength and increase proprioception to improve the patients ability to transfer       Billed With/As:   [] TE   [] TA   [] Neuro   [] Self Care Patient Education: [x] Review HEP    [] Progressed/Changed HEP based on:   [] positioning   [] body mechanics   [] transfers   [] heat/ice application    [] other:      Other Objective/Functional Measures:  Te/nmr as per flow sheet   Increased mm tone is noted along L sor which reproduced face/jaw pain   Post Treatment Pain Level (on 0 to 10) scale:   2 / 10     ASSESSMENT  Assessment/Changes in Function:   Multiple areas of dysfunction impacting motor function        []  See Progress Note/Recertification   Patient will continue to benefit from skilled PT services to modify and progress therapeutic interventions, address functional mobility deficits, address ROM deficits, address strength deficits, analyze and address soft tissue restrictions, analyze and cue movement patterns, analyze and modify body mechanics/ergonomics, assess and modify postural abnormalities and instruct in home and community integration to attain remaining goals. Progress toward goals / Updated goals:  3 days of 0-1/10 pain since last visit - progress with pain reduction.  Due to the complexity of her spine anticipate an additional month of PT to achieve more consistent pain reduction      PLAN  []  Upgrade activities as tolerated yes Continue plan of care   []  Discharge due to :    []  Other:      Therapist: Azar Clement PT    Date: 1/26/2022 Time: 7:01 AM     Future Appointments   Date Time Provider Muna Chin   1/26/2022 11:00 AM Samuel Pope, PT [place] [facility]   1/28/2022  9:30 AM Jose Peterson, PT [id number]

## 2022-01-28 ENCOUNTER — HOSPITAL ENCOUNTER (OUTPATIENT)
Dept: PHYSICAL THERAPY | Age: 32
Discharge: HOME | End: 2022-01-28
Payer: MEDICAID

## 2022-01-28 PROCEDURE — 97112 NEUROMUSCULAR REEDUCATION: CPT | Performed by: PHYSICAL THERAPIST

## 2022-01-28 PROCEDURE — 97110 THERAPEUTIC EXERCISES: CPT | Performed by: PHYSICAL THERAPIST

## 2022-01-28 NOTE — PROGRESS NOTES
[facility] PHYSICAL THERAPY - DAILY TREATMENT NOTE    Patient Name: Shade Begun        Date: 2022  : 1990   yes Patient  Verified  Visit #:   14     18  Insurance: Payor: Brenda Hogan / Plan: [employer] PLUS / Product Type: Managed Care Medicaid /      In time: 163 Out time:    Total Treatment Time: 45     Medicare/BCBS Time Tracking (below)   Total Timed Codes (min):  45 1:1 Treatment Time:  45      TREATMENT AREA =  Cervicalgia [M54.2]    SUBJECTIVE  Pain Level (on 0 to 10 scale):  3 / 10   Medication Changes/New allergies or changes in medical history, any new surgeries or procedures?    no  If yes, update Summary List   Subjective Functional Status/Changes:  _  No changes reported   I had no HA until today so that is pretty good. I still cannot figure out what triggers my pain      OBJECTIVE  23 min Therapeutic exercises  [x]  See flow sheet   Rationale:    increase strength and increase proprioception to improve the patients ability to transfer       7 min Manual Therapy: andry Acuña   Rationale:      decrease pain, increase ROM, increase tissue extensibility and decrease trigger points to improve patient's ability to complete adls  The manual therapy interventions were performed at a separate and distinct time from the therapeutic activities interventions.     15 min Neuromuscular Re-ed: [x]  See flow sheet   Rationale:    increase strength and increase proprioception to improve the patients ability to transfer       Billed With/As:   [] TE   [] TA   [] Neuro   [] Self Care Patient Education: [x] Review HEP    [] Progressed/Changed HEP based on:   [] positioning   [] body mechanics   [] transfers   [] heat/ice application    [] other:      Other Objective/Functional Measures:  Te/nmr as per flow sheet   Able to perform self rib mobilizations I wit end of session    Post Treatment Pain Level (on 0 to 10) scale:   2 / 10     ASSESSMENT  Assessment/Changes in Function:   Multiple areas of dysfunction impacting motor function        []  See Progress Note/Recertification   Patient will continue to benefit from skilled PT services to modify and progress therapeutic interventions, address functional mobility deficits, address ROM deficits, address strength deficits, analyze and address soft tissue restrictions, analyze and cue movement patterns, analyze and modify body mechanics/ergonomics, assess and modify postural abnormalities and instruct in home and community integration to attain remaining goals.    Progress toward goals / Updated goals:  Progress with consistent pain reduction       PLAN  []  Upgrade activities as tolerated yes Continue plan of care   []  Discharge due to :    []  Other:      Therapist: Asad Cobian, PT    Date: 1/28/2022 Time: 7:01 AM     Future Appointments   Date Time Provider Muna Chin   2/1/2022  9:30 AM Anitra Elam,  [address] [facility]   2/3/2022  9:30 AM Anitra Elam, PT [facility]   2/8/2022  9:30 AM Anitra Elam,  [address] [facility]   2/10/2022  9:30 AM Brandin Peterson, PT [id number]

## 2022-02-01 ENCOUNTER — HOSPITAL ENCOUNTER (OUTPATIENT)
Dept: PHYSICAL THERAPY | Age: 32
Discharge: HOME | End: 2022-02-01
Payer: MEDICAID

## 2022-02-01 PROCEDURE — 97112 NEUROMUSCULAR REEDUCATION: CPT | Performed by: PHYSICAL THERAPIST

## 2022-02-01 PROCEDURE — 97110 THERAPEUTIC EXERCISES: CPT | Performed by: PHYSICAL THERAPIST

## 2022-02-01 NOTE — PROGRESS NOTES
[facility] PHYSICAL THERAPY - DAILY TREATMENT NOTE    Patient Name: Betina Leroy        Date: 2022  : 1990   yes Patient  Verified  Visit #:   15  of   23  Insurance: Payor: SHIRLEY Kerr / Plan: [facility] HEALTHKEEPERS PLUS / Product Type: Managed Care Medicaid /      In time: 389 Out time: 945   Total Treatment Time: 30     Medicare/BCBS Time Tracking (below)   Total Timed Codes (min):  30 1:1 Treatment Time:  30      TREATMENT AREA =  Cervicalgia [M54.2]    SUBJECTIVE  Pain Level (on 0 to 10 scale):  2 / 10   Medication Changes/New allergies or changes in medical history, any new surgeries or procedures?    no  If yes, update Summary List   Subjective Functional Status/Changes:  _  No changes reported   See pn      OBJECTIVE  13 min Therapeutic exercises  [x]  See flow sheet   Rationale:    increase strength and increase proprioception to improve the patients ability to transfer       7 min Manual Therapy: [place], sor   Rationale:      decrease pain, increase ROM, increase tissue extensibility and decrease trigger points to improve patient's ability to complete adls  The manual therapy interventions were performed at a separate and distinct time from the therapeutic activities interventions.     10 min Neuromuscular Re-ed: [x]  See flow sheet   Rationale:    increase strength and increase proprioception to improve the patients ability to transfer       Billed With/As:   [] TE   [] TA   [] Neuro   [] Self Care Patient Education: [x] Review HEP    [] Progressed/Changed HEP based on:   [] positioning   [] body mechanics   [] transfers   [] heat/ice application    [] other:      Other Objective/Functional Measures:  Pt with additional appt at 1015 time constraint this appt   See pn   Post Treatment Pain Level (on 0 to 10) scale:   1 / 10     ASSESSMENT  Assessment/Changes in Function:   See pn       []  See Progress Note/Recertification   Patient will continue to benefit from skilled PT services to modify and progress therapeutic interventions, address functional mobility deficits, address ROM deficits, address strength deficits, analyze and address soft tissue restrictions, analyze and cue movement patterns, analyze and modify body mechanics/ergonomics, assess and modify postural abnormalities and instruct in home and community integration to attain remaining goals.    Progress toward goals / Updated goals:  See pn     PLAN  []  Upgrade activities as tolerated yes Continue plan of care   []  Discharge due to :    []  Other:      Therapist: Earnestine Carrasquillo PT    Date: 2/1/2022 Time: 7:01 AM     Future Appointments   Date Time Provider Muna Chin   2/3/2022  9:30 AM Sera Rivero,  [address] [facility]   2/8/2022  9:30 AM Sera Rivero, PT [facility]   2/10/2022  9:30 AM Michael Peterson, PT [id number]

## 2022-02-01 NOTE — PROGRESS NOTES
[address] [facility]  [address], [place] [address],[place], [address] [facility] [address] - Phone: [phone]  Fax: [phone]  PROGRESS NOTE  Patient Name: Golden Kenny : 1990   Treatment/Medical Diagnosis: Cervicalgia [M54.2]   Referral Source: Simone Ceron MD     Date of Initial Visit: 10/25/21 Attended Visits: 15 Missed Visits: 0     SUMMARY OF TREATMENT  Therapeutic exercises and activities for thoracic/rib mobility, neuromuscular education on breathing techniques, manual therapy as approved by insurance and instruction on hep/actvity modification     CURRENT STATUS  I have been consistently treated Ms. Raul Kaye for last 3 weeks with good results. Reports 40% improvement incld cervical AROM: flexion 50 degrees, extension 70 degrees, right lateral flexion 25 degrees, left 24 degrees, right rotation 67 degrees, left 75 degrees. Manual Muscle Testin+/5 bilateral UE strength. Decreased thoracic mobility noted with reduced rotation 50% to L, 25% to R (previously near 90% loss). Her R FIR has improved from lower lumbar to lower thoracic. In addition she has +genevieve test on R, pec minor and reduced ribs 1-3 ap/pa mobility. This is inhibiting rotational mobility. Since a lot of here pain/dysfunction appears biomechanical in nature (diagnostic testing unremarkable) believe skilled therapy services would be extremely helpful. If she does not receive these services believe that she will regress. Goal/Measure of Progress Goal Met? 1.  FOTO Score 60 points   Status at last Eval: 39 Current Status: Did not fill out n/a   2. GROC +4   Status at last Eval: +1 Current Status: GROC +2 progressing   3. Pain 5/10 at worst   Status at last Eval: 810 at worst Current Status: 6/10 at worst progressing     New Goals to be achieved in __4__  weeks:  1. Achieve goal #1  2. Achieve goal #2  3.  Achieve goal#3    RECOMMENDATIONS  Recommend continued PT 2 x week x 4 weeks to further improve cervical pain, spinals mobility and postural stability. If you have any questions/comments please contact us directly at [phone]. Thank you for allowing us to assist in the care of your patient. Therapist Signature: Annie Cardenas PT Date: 2/1/2022     Time: 6:27 PM   NOTE TO PHYSICIAN:  PLEASE COMPLETE THE ORDERS BELOW AND FAX TO   [facility]: [phone]  If you are unable to process this request in 24 hours please contact our office: [phone]    ___ I have read the above report and request that my patient continue as recommended.   ___ I have read the above report and request that my patient continue therapy with the following changes/special instructions:_________________________________________________________   ___ I have read the above report and request that my patient be discharged from therapy.      Physician Signature:        Date:       Time:                                 Polina Benitez MD

## 2022-02-03 ENCOUNTER — HOSPITAL ENCOUNTER (OUTPATIENT)
Dept: PHYSICAL THERAPY | Age: 32
Discharge: HOME | End: 2022-02-03
Payer: MEDICAID

## 2022-02-03 PROCEDURE — 97110 THERAPEUTIC EXERCISES: CPT | Performed by: PHYSICAL THERAPIST

## 2022-02-03 PROCEDURE — 97112 NEUROMUSCULAR REEDUCATION: CPT | Performed by: PHYSICAL THERAPIST

## 2022-02-03 NOTE — PROGRESS NOTES
[facility] PHYSICAL THERAPY - DAILY TREATMENT NOTE    Patient Name: Edelmira Choudhury        Date: 2/3/2022  : 1990   yes Patient  Verified  Visit #:   16    Insurance: Payor: BLUE CROSS MEDICAID / Plan: [facility] HEALTHKEEPERS PLUS / Product Type: Managed Care Medicaid /      In time: 091 Out time: 1000   Total Treatment Time: 30     Medicare/BCBS Time Tracking (below)   Total Timed Codes (min):  30 1:1 Treatment Time:  30      TREATMENT AREA =  Cervicalgia [M54.2]    SUBJECTIVE  Pain Level (on 0 to 10 scale):  2 / 10   Medication Changes/New allergies or changes in medical history, any new surgeries or procedures?    no  If yes, update Summary List   Subjective Functional Status/Changes:  _  No changes reported   Think I am doing the thread the needle wrong because it makes my neck feel tighter      OBJECTIVE  13 min Therapeutic exercises  [x]  See flow sheet   Rationale:    increase strength and increase proprioception to improve the patients ability to transfer       7 min Manual Therapy: andry Acuña   Rationale:      decrease pain, increase ROM, increase tissue extensibility and decrease trigger points to improve patient's ability to complete adls  The manual therapy interventions were performed at a separate and distinct time from the therapeutic activities interventions.     10 min Neuromuscular Re-ed: [x]  See flow sheet   Rationale:    increase strength and increase proprioception to improve the patients ability to transfer       Billed With/As:   [] TE   [] TA   [] Neuro   [] Self Care Patient Education: [x] Review HEP    [] Progressed/Changed HEP based on:   [] positioning   [] body mechanics   [] transfers   [] heat/ice application    [] other:      Other Objective/Functional Measures:  Pt with excessive cervical extension during thread needle - advised to cup hand around neck to prevent - able to perform without progressive pain    Post Treatment Pain Level (on 0 to 10) scale:   1 / 10 ASSESSMENT  Assessment/Changes in Function:     No increase in s/s following session      []  See Progress Note/Recertification   Patient will continue to benefit from skilled PT services to modify and progress therapeutic interventions, address functional mobility deficits, address ROM deficits, address strength deficits, analyze and address soft tissue restrictions, analyze and cue movement patterns, analyze and modify body mechanics/ergonomics, assess and modify postural abnormalities and instruct in home and community integration to attain remaining goals.    Progress toward goals / Updated goals:  Progress with pain reduction      PLAN  []  Upgrade activities as tolerated yes Continue plan of care   []  Discharge due to :    []  Other:      Therapist: Florian Lilly, PT    Date: 2/3/2022 Time: 7:01 AM     Future Appointments   Date Time Provider Muna Chin   2/3/2022  9:30 AM Verner Crumble, PT [facility]   2/8/2022  9:30 AM Verner Crumble, M[facility]   2/10/2022  9:30 AM Abiel Peterson, PT [facility] [address]

## 2022-02-08 ENCOUNTER — HOSPITAL ENCOUNTER (OUTPATIENT)
Dept: PHYSICAL THERAPY | Age: 32
Discharge: HOME | End: 2022-02-08
Payer: MEDICAID

## 2022-02-08 PROCEDURE — 97112 NEUROMUSCULAR REEDUCATION: CPT | Performed by: PHYSICAL THERAPIST

## 2022-02-08 PROCEDURE — 97110 THERAPEUTIC EXERCISES: CPT | Performed by: PHYSICAL THERAPIST

## 2022-02-08 NOTE — PROGRESS NOTES
[facility] Post PHYSICAL THERAPY - DAILY TREATMENT NOTE    Patient Name: Chaim Roque        Date: 2022  : 1990   yes Patient  Verified  Visit #:     Insurance: Payor: SHIRLEY Banerjee / Plan: [facility] HEALTHKEEPERS PLUS / Product Type: Managed Care Medicaid /      In time: 816 Out time: 1000   Total Treatment Time: 30     Medicare/BCBS Time Tracking (below)   Total Timed Codes (min):  30 1:1 Treatment Time:  30      TREATMENT AREA =  Cervicalgia [M54.2]    SUBJECTIVE  Pain Level (on 0 to 10 scale):   / 10   Medication Changes/New allergies or changes in medical history, any new surgeries or procedures?    no  If yes, update Summary List   Subjective Functional Status/Changes:  _  No changes reported   I was good for several days after last time. I didn't feel anything until I tried to lift this light fixture overhead and I could not do it. Too much pain     OBJECTIVE  13 min Therapeutic exercises  [x]  See flow sheet   Rationale:    increase strength and increase proprioception to improve the patients ability to transfer       7 min Manual Therapy: Ct junction mobs   Rationale:      decrease pain, increase ROM, increase tissue extensibility and decrease trigger points to improve patient's ability to complete adls  The manual therapy interventions were performed at a separate and distinct time from the therapeutic activities interventions.     10 min Neuromuscular Re-ed: [x]  See flow sheet   Rationale:    increase strength and increase proprioception to improve the patients ability to transfer       Billed With/As:   [] TE   [] TA   [] Neuro   [] Self Care Patient Education: [x] Review HEP    [] Progressed/Changed HEP based on:   [] positioning   [] body mechanics   [] transfers   [] heat/ice application    [] other:      Other Objective/Functional Measures:   modified thread the needle to sahra pose with 20-30\" hold as pt unable to perform in quadped without increase neck pain   Post Treatment Pain Level (on 0 to 10) scale:   1 / 10     ASSESSMENT  Assessment/Changes in Function:     Overhead lifting still increases cervical mm pain     []  See Progress Note/Recertification   Patient will continue to benefit from skilled PT services to modify and progress therapeutic interventions, address functional mobility deficits, address ROM deficits, address strength deficits, analyze and address soft tissue restrictions, analyze and cue movement patterns, analyze and modify body mechanics/ergonomics, assess and modify postural abnormalities and instruct in home and community integration to attain remaining goals.    Progress toward goals / Updated goals:  Slow but steady progress with sx reduction and pain control     PLAN  []  Upgrade activities as tolerated yes Continue plan of care   []  Discharge due to :    []  Other:      Therapist: Niraj Salinas PT    Date: 2/8/2022 Time: 7:01 AM     Future Appointments   Date Time Provider Muna Chin   2/8/2022  9:30 AM Dl Mejia, PT [facility]   2/10/2022  9:30 AM Dl Mejia PT [facility]   2/15/2022  9:30 AM Dl Mejia PT [facility]   2/17/2022  9:30 AM Shiraz Peterson, PT [address] [id number]

## 2022-02-10 ENCOUNTER — HOSPITAL ENCOUNTER (OUTPATIENT)
Dept: PHYSICAL THERAPY | Age: 32
Discharge: HOME | End: 2022-02-10
Payer: MEDICAID

## 2022-02-10 PROCEDURE — 97110 THERAPEUTIC EXERCISES: CPT | Performed by: PHYSICAL THERAPIST

## 2022-02-10 PROCEDURE — 97530 THERAPEUTIC ACTIVITIES: CPT | Performed by: PHYSICAL THERAPIST

## 2022-02-10 NOTE — PROGRESS NOTES
Jaiden Glynn PHYSICAL THERAPY - DAILY TREATMENT NOTE    Patient Name: Damián Pina        Date: 2/10/2022  : 1990   yes Patient  Verified  Visit #:     Insurance: Payor: Risa Dear / Plan: [facility] HEALTHKEEPERS PLUS / Product Type: Managed Care Medicaid /      In time: 315 Out time: 091   Total Treatment Time: 27     Medicare/BCBS Time Tracking (below)   Total Timed Codes (min):  27 1:1 Treatment Time:  27      TREATMENT AREA =  Cervicalgia [M54.2]    SUBJECTIVE  Pain Level (on 0 to 10 scale):  1 / 10   Medication Changes/New allergies or changes in medical history, any new surgeries or procedures?    no  If yes, update Summary List   Subjective Functional Status/Changes:  _  No changes reported   I am actually not feeling too bad at all. I feel like I have a normal neck now     OBJECTIVE  13 min Therapeutic exercises  [x]  See flow sheet   Rationale:    increase strength and increase proprioception to improve the patients ability to transfer       4 min Manual Therapy: Ct junction mobs   Rationale:      decrease pain, increase ROM, increase tissue extensibility and decrease trigger points to improve patient's ability to complete adls  The manual therapy interventions were performed at a separate and distinct time from the therapeutic activities interventions.     10 min Neuromuscular Re-ed: [x]  See flow sheet   Rationale:    increase strength and increase proprioception to improve the patients ability to transfer       Billed With/As:   [] TE   [] TA   [] Neuro   [] Self Care Patient Education: [x] Review HEP    [] Progressed/Changed HEP based on:   [] positioning   [] body mechanics   [] transfers   [] heat/ice application    [] other:      Other Objective/Functional Measures:  Able to perform all te/ta without complications or pain, full cervical rotation   Post Treatment Pain Level (on 0 to 10) scale:   0 / 10     ASSESSMENT  Assessment/Changes in Function:     No pain at end of session      []  See Progress Note/Recertification   Patient will continue to benefit from skilled PT services to modify and progress therapeutic interventions, address functional mobility deficits, address ROM deficits, address strength deficits, analyze and address soft tissue restrictions, analyze and cue movement patterns, analyze and modify body mechanics/ergonomics, assess and modify postural abnormalities and instruct in home and community integration to attain remaining goals.    Progress toward goals / Updated goals:  Slow but steady progress with sx reduction and pain control     PLAN  []  Upgrade activities as tolerated yes Continue plan of care   []  Discharge due to :    []  Other:      Therapist: Vanessa Manzo PT    Date: 2/10/2022 Time: 7:01 AM     Future Appointments   Date Time Provider Muna Chin   2/15/2022  9:30 AM Jennie Castleman, PT [facility]   2/17/2022  9:30 AM Nyla Peterson, PT [facility]

## 2022-02-15 ENCOUNTER — HOSPITAL ENCOUNTER (OUTPATIENT)
Dept: PHYSICAL THERAPY | Age: 32
Discharge: HOME | End: 2022-02-15
Payer: MEDICAID

## 2022-02-15 PROCEDURE — 97530 THERAPEUTIC ACTIVITIES: CPT | Performed by: PHYSICAL THERAPIST

## 2022-02-15 PROCEDURE — 97110 THERAPEUTIC EXERCISES: CPT | Performed by: PHYSICAL THERAPIST

## 2022-02-15 NOTE — PROGRESS NOTES
[facility]rocio PHYSICAL THERAPY - DAILY TREATMENT NOTE    Patient Name: Camilo Urias        Date: 2/15/2022  : 1990   yes Patient  Verified  Visit #:     Insurance: Payor: Linda Salazar / Plan: [facility] HEALTHKEEPERS PLUS / Product Type: Managed Care Medicaid /      In time: 835 Out time: 283   Total Treatment Time: 33     Medicare/BCBS Time Tracking (below)   Total Timed Codes (min):  33 1:1 Treatment Time: 33      TREATMENT AREA =  Cervicalgia [M54.2]    SUBJECTIVE  Pain Level (on 0 to 10 scale):  1 / 10   Medication Changes/New allergies or changes in medical history, any new surgeries or procedures?    no  If yes, update Summary List   Subjective Functional Status/Changes:  _  No changes reported   I feel loose like not as painful. I can actually say for the first time in a long time that I am getting better      OBJECTIVE  19 min Therapeutic exercises  [x]  See flow sheet   Rationale:    increase strength and increase proprioception to improve the patients ability to transfer       4 min Manual Therapy: Ct junction mobs, R first rib depression    Rationale:      decrease pain, increase ROM, increase tissue extensibility and decrease trigger points to improve patient's ability to complete adls  The manual therapy interventions were performed at a separate and distinct time from the therapeutic activities interventions.     10 min Neuromuscular Re-ed: [x]  See flow sheet   Rationale:    increase strength and increase proprioception to improve the patients ability to transfer       Billed With/As:   [] TE   [] TA   [] Neuro   [] Self Care Patient Education: [x] Review HEP    [] Progressed/Changed HEP based on:   [] positioning   [] body mechanics   [] transfers   [] heat/ice application    [] other:      Other Objective/Functional Measures:  Progressed overhead lifting as per flow sheet - limited to 1# before UT compensation is noted  Cues required 100% of time for scapular control Post Treatment Pain Level (on 0 to 10) scale:   0 / 10     ASSESSMENT  Assessment/Changes in Function:     No pain at end of session      []  See Progress Note/Recertification   Patient will continue to benefit from skilled PT services to modify and progress therapeutic interventions, address functional mobility deficits, address ROM deficits, address strength deficits, analyze and address soft tissue restrictions, analyze and cue movement patterns, analyze and modify body mechanics/ergonomics, assess and modify postural abnormalities and instruct in home and community integration to attain remaining goals.    Progress toward goals / Updated goals:  Slow but steady progress with sx reduction and pain control     PLAN  []  Upgrade activities as tolerated yes Continue plan of care   []  Discharge due to :    []  Other:      Therapist: Apolinar Silva, PT    Date: 2/15/2022 Time: 7:01 AM     Future Appointments   Date Time Provider Muna Chin   2/17/2022  9:30 AM Sam Orellana, PT [address] [id number]

## 2022-02-17 ENCOUNTER — APPOINTMENT (OUTPATIENT)
Dept: PHYSICAL THERAPY | Age: 32
End: 2022-02-17
Payer: MEDICAID

## 2022-02-22 ENCOUNTER — HOSPITAL ENCOUNTER (OUTPATIENT)
Dept: PHYSICAL THERAPY | Age: 32
Discharge: HOME | End: 2022-02-22
Payer: MEDICAID

## 2022-02-22 PROCEDURE — 97110 THERAPEUTIC EXERCISES: CPT | Performed by: PHYSICAL THERAPIST

## 2022-02-22 PROCEDURE — 97112 NEUROMUSCULAR REEDUCATION: CPT | Performed by: PHYSICAL THERAPIST

## 2022-02-22 NOTE — PROGRESS NOTES
Fadia Zavaleta PHYSICAL THERAPY - DAILY TREATMENT NOTE    Patient Name: Bambi Son        Date: 2022  : 1990   yes Patient  Verified  Visit #:    Insurance: Payor: Meliton Arndt / Plan: [facility] HEALTHKEEPERS PLUS / Product Type: Managed Care Medicaid /      In time: 474 Out time: 7604   Total Treatment Time: 38     Medicare/BCBS Time Tracking (below)   Total Timed Codes (min):  38 1:1 Treatment Time: 38      TREATMENT AREA =  Cervicalgia [M54.2]    SUBJECTIVE  Pain Level (on 0 to 10 scale):  4 / 10   Medication Changes/New allergies or changes in medical history, any new surgeries or procedures?    no  If yes, update Summary List   Subjective Functional Status/Changes:  _  No changes reported   I feel like I have taken a step back in the last week. I am not sure if its because I only came in 1x/week or that I have been carrying hte car seat on the right but my right side from my neck to my foot is killing me      OBJECTIVE  21 min Therapeutic exercises  [x]  See flow sheet   Rationale:    increase strength and increase proprioception to improve the patients ability to transfer       7 min Manual Therapy: R psoas release, R midfoot mobs    Rationale:      decrease pain, increase ROM, increase tissue extensibility and decrease trigger points to improve patient's ability to complete adls  The manual therapy interventions were performed at a separate and distinct time from the therapeutic activities interventions.     10 min Neuromuscular Re-ed: [x]  See flow sheet   Rationale:    increase strength and increase proprioception to improve the patients ability to transfer       Billed With/As:   [] TE   [] TA   [] Neuro   [] Self Care Patient Education: [x] Review HEP    [] Progressed/Changed HEP based on:   [] positioning   [] body mechanics   [] transfers   [] heat/ice application    [] other:      Other Objective/Functional Measures:   arrived to session with elevated pain levels particularly in the R anterior hip (chris/rf) and tfl, loss of midfoot mobility and hallux extension which could be contributing to hip er compensation and pain   Post Treatment Pain Level (on 0 to 10) scale:   2 / 10     ASSESSMENT  Assessment/Changes in Function:   Multiple areas of dysfunction contributing to pain        []  See Progress Note/Recertification   Patient will continue to benefit from skilled PT services to modify and progress therapeutic interventions, address functional mobility deficits, address ROM deficits, address strength deficits, analyze and address soft tissue restrictions, analyze and cue movement patterns, analyze and modify body mechanics/ergonomics, assess and modify postural abnormalities and instruct in home and community integration to attain remaining goals.    Progress toward goals / Updated goals:  Pt pain level went from 4/10 to 7/10 in one week      PLAN  []  Upgrade activities as tolerated yes Continue plan of care   []  Discharge due to :    []  Other:      Therapist: Tim Hall, PT    Date: 2/22/2022 Time: 7:01 AM     Future Appointments   Date Time Provider Muna Chin   2/22/2022  9:30 AM Sanya Chavez, PT [facility] [id number]

## 2022-02-24 ENCOUNTER — APPOINTMENT (OUTPATIENT)
Dept: PHYSICAL THERAPY | Age: 32
End: 2022-02-24
Payer: MEDICAID

## 2022-02-25 ENCOUNTER — HOSPITAL ENCOUNTER (OUTPATIENT)
Dept: PHYSICAL THERAPY | Age: 32
Discharge: HOME | End: 2022-02-25
Payer: MEDICAID

## 2022-02-25 PROCEDURE — 97110 THERAPEUTIC EXERCISES: CPT | Performed by: PHYSICAL THERAPIST

## 2022-02-25 PROCEDURE — 97112 NEUROMUSCULAR REEDUCATION: CPT | Performed by: PHYSICAL THERAPIST

## 2022-02-25 NOTE — PROGRESS NOTES
Heide Beckman PHYSICAL THERAPY - DAILY TREATMENT NOTE    Patient Name: Enzo Fiore        Date: 2022  : 1990   yes Patient  Verified  Visit #:    Insurance: Payor: BLUE CROSS MEDICAID / Plan: [facility] [employer] HEALTHKEEPERS PLUS / Product Type: Managed Care Medicaid /      In time: 371 Out time: 7151   Total Treatment Time: 35     Medicare/BCBS Time Tracking (below)   Total Timed Codes (min):  35 1:1 Treatment Time: 35      TREATMENT AREA =  Cervicalgia [M54.2]    SUBJECTIVE  Pain Level (on 0 to 10 scale):   10   Medication Changes/New allergies or changes in medical history, any new surgeries or procedures?    no  If yes, update Summary List   Subjective Functional Status/Changes:  _  No changes reported   See pn     OBJECTIVE  21 min Therapeutic exercises  [x]  See flow sheet   Rationale:    increase strength and increase proprioception to improve the patients ability to transfer       7 min Manual Therapy: R psoas release, R midfoot mobs    Rationale:      decrease pain, increase ROM, increase tissue extensibility and decrease trigger points to improve patient's ability to complete adls  The manual therapy interventions were performed at a separate and distinct time from the therapeutic activities interventions.     10 min Neuromuscular Re-ed: [x]  See flow sheet   Rationale:    increase strength and increase proprioception to improve the patients ability to transfer       Billed With/As:   [] TE   [] TA   [] Neuro   [] Self Care Patient Education: [x] Review HEP    [] Progressed/Changed HEP based on:   [] positioning   [] body mechanics   [] transfers   [] heat/ice application    [] other:      Other Objective/Functional Measures:  See pn   Post Treatment Pain Level (on 0 to 10) scale:    10     ASSESSMENT  Assessment/Changes in Function:   See pn       []  See Progress Note/Recertification   Patient will continue to benefit from skilled PT services to modify and progress therapeutic interventions, address functional mobility deficits, address ROM deficits, address strength deficits, analyze and address soft tissue restrictions, analyze and cue movement patterns, analyze and modify body mechanics/ergonomics, assess and modify postural abnormalities and instruct in home and community integration to attain remaining goals.    Progress toward goals / Updated goals:  See pn     PLAN  []  Upgrade activities as tolerated yes Continue plan of care   []  Discharge due to :    []  Other:      Therapist: Ana Garcia PT    Date: 2/25/2022 Time: 7:01 AM     Future Appointments   Date Time Provider Muna Chin   3/14/2022  9:30 AM Samuel Barrett MD [facility]

## 2022-03-14 ENCOUNTER — VIRTUAL VISIT (OUTPATIENT)
Dept: SPORTS MEDICINE | Age: 32
End: 2022-03-14
Payer: MEDICAID

## 2022-03-14 DIAGNOSIS — M54.2 NECK PAIN ON RIGHT SIDE: Primary | ICD-10-CM

## 2022-03-14 DIAGNOSIS — R20.2 ARM PARESTHESIA, RIGHT: ICD-10-CM

## 2022-03-14 PROCEDURE — 99214 OFFICE O/P EST MOD 30 MIN: CPT | Performed by: FAMILY MEDICINE

## 2022-03-14 NOTE — PROGRESS NOTES
[place] [facility] [place] [facility]  Sports Medicine - Telemedicine Follow-Up Note    France Bates is a 33 y.o. female (: 1990) presenting to address:  No chief complaint on file. PCP: None  Referring provider: self-referral         Assessment & Plan:       ICD-10-CM ICD-9-CM   1. Neck pain on right side  M54.2 723.1   2. Arm paresthesia, right  R20.2 782.0       32yo F 8.5mo postpartum initially presented early 2021 with chronic RIGHT neck pain and RIGHT thumb pain, recently worsened over the preceding few months.       Since last visit:    Some improvement with formal PT, however symptoms continue to occur intermittently. Understandably frustrated with chronicity of symptoms.        Chronic RIGHT neck pain with RIGHT arm paresthesia: only partial response to formal physical therapy    > continue formal physical therapy to establish self-management of episodic symptoms  > needs advanced imaging to determine if procedural interventions are warranted. > discussion re: other options including referral to physiatry (pain mgmt) for additional subspecialty evaluation. Will provide information re: local practices. Pt to contact office if she is interested in formal referral    Orders Placed This Encounter    MRI CERV SPINE WO CONT     Standing Status:   Future     Standing Expiration Date:   2023     Scheduling Instructions:      Please provide imaging to patient on CD     Order Specific Question:   Is Patient Pregnant? Answer:   No         Management plan & patient instructions discussed with France Bates, who voiced understanding. Man Napoles MD  Internal Medicine, Family Medicine & Sports Medicine  3/14/2022      Subjective   Subjective:     Chief Complaint   Patient presents with    Neck Pain    Arm Pain     right       Notes partial improvement with formal PT. Scheduling has been difficult 2/2 insurance limitations and childcare.   Has less frequent paresthesia however still does occur. Is mostly frustrated by the chronicity of the symptoms, and the severity when episodes do occur, which seem without inciting event / activity. No interval trauma      Prior to Admission medications    Medication Sig Start Date End Date Taking? Authorizing Provider   buprenorphine HCl/naloxone HCl (SUBOXONE SL) 3 mg by SubLINGual route. Provider, Historical     No Known Allergies    Patient Active Problem List    Diagnosis Date Noted    Generalized anxiety disorder 02/28/2013     Past Medical History:   Diagnosis Date    Headache(784.0)      No past surgical history on file. Family History   Problem Relation Age of Onset    Asthma Mother     Asthma Brother      Social History     Tobacco Use    Smoking status: Current Some Day Smoker     Packs/day: 0.25     Years: 1.00     Pack years: 0.25    Smokeless tobacco: Never Used   Substance Use Topics    Alcohol use: No     Social History     Social History Narrative    Not on file          Objective   Objective:   Vital Signs: (As obtained by patient/caregiver at home)  There were no vitals taken for this visit. Physical Exam  Constitutional:       General: She is awake. She is not in acute distress. Appearance: She is not toxic-appearing or diaphoretic. HENT:      Head: Normocephalic and atraumatic. Nose: Nose normal.   Eyes:      General: No scleral icterus. Extraocular Movements: Extraocular movements intact. Conjunctiva/sclera: Conjunctivae normal.   Pulmonary:      Effort: Pulmonary effort is normal. No accessory muscle usage or respiratory distress. Musculoskeletal:      Cervical back: Full passive range of motion without pain. Skin:     General: Skin is dry. Neurological:      Mental Status: She is alert. Cranial Nerves: No cranial nerve deficit.    Psychiatric:         Attention and Perception: Attention and perception normal.         Mood and Affect: Mood and affect normal.         Speech: Speech normal. Behavior: Behavior normal. Behavior is cooperative. Cognition and Memory: Cognition and memory normal.           Labs / Results:       XR Results (maximum last 3): Results from Appointment encounter on 09/03/21    XR SPINE CERV W OBL/FLEX/EXT MIN 6 V COMP    Narrative  EXAM: XR SPINE CERV W OBL/FLEX/EXT MIN 6 V COMP    INDICATION: neck pain. Arm paresthesia, right. Neck pain for 2 years. No known trauma. Occasional numbness in the fingers on  the right side. COMPARISON: None. TECHNIQUE: 7 views of the cervical spine were obtained. FINDINGS:  No prevertebral soft tissue swelling. Vertebral body heights are maintained. Disc heights are relatively well-maintained. No listhesis. No evidence for  instability on flexion or extension. No right neuroforaminal stenosis. Possible  stenosis of the lower left neural foramina, but evaluation is limited due to  patient positioning. Further evaluation with MRI may be useful. Visualized lung apices are clear. Impression  As above. Review of Records:     Reviewed PT notes 11/29/2021, 01/05/2022, 02/01/2022  Spoke with PT Michael Roca, who was evaluated through a synchronous (real-time) audio-video encounter, and/or her healthcare decision maker, is aware that it is a billable service, which includes applicable co-pays, with coverage as determined by her insurance carrier. She provided verbal consent to proceed and patient identification was verified. This visit was conducted pursuant to the emergency declaration under the [address] [place] [address] authority and the [employer]. A caregiver was present when appropriate. Ability to conduct physical exam was limited. The patient was located at home in a state where the provider was licensed to provide care. An electronic signature was used to authenticate this note.       Addendum (4/11/2022):     Per PT Lucio Cortez is reporting recurrence of symptoms and desire to restart formal physical therapy.     Orders Placed This Encounter    REFERRAL TO PHYSICAL THERAPY     Referral Priority:   Routine     Referral Type:   PT/OT/ST     Referral Reason:   Specialty Services Required     Number of Visits Requested:   1         Delisa Scott MD  Internal Medicine, Family Medicine & Sports Medicine  4/11/2022 3:20 PM

## 2022-03-17 ENCOUNTER — APPOINTMENT (OUTPATIENT)
Dept: PHYSICAL THERAPY | Age: 32
End: 2022-03-17

## 2022-04-19 ENCOUNTER — HOSPITAL ENCOUNTER (OUTPATIENT)
Dept: PHYSICAL THERAPY | Age: 32
Discharge: HOME | End: 2022-04-19
Attending: FAMILY MEDICINE
Payer: MEDICAID

## 2022-04-19 PROCEDURE — 97162 PT EVAL MOD COMPLEX 30 MIN: CPT | Performed by: PHYSICAL THERAPIST

## 2022-04-19 NOTE — PROGRESS NOTES
.[facility] [address], [address],[facility], [facility] - Phone: [phone]  Fax: [phone] / [phone]  Patient Name: Tyrell Cai : 1990   Medical   Diagnosis: Neck/arm pain Treatment Diagnosis: Neck pain on right side [M54.2]  Arm paresthesia, right [R20.2]   Onset Date: chronic     Referral Source: Jigar Barahona MD Start of Care [facility]): 2022   Prior Hospitalization: See medical history Provider #: [id number]   Prior Level of Function: Pain with lifting, reaching, prolonged walking   Comorbidities:  section    Medications: Verified on Patient Summary List   The Plan of Care and following information is based on the information from the initial evaluation.   ===========================================================================================  Assessment / key information:  33 F returns to therapy after lapse in care due to insurance authorization. Returns with similar sx but worse since stopping therapy. She is pending cervical mri on 22. Pain located along neck, R should blade and medial arm, mid thoracic and R steve-pelvis. Pain 8-10/10 increasing with activity. Objective findings: forward head posture, IR R shoulder with protracted scapulae, B apt with excessive lordosis, c/s arom limited in ext/L rot 50%, (-) myotome/dermatome scane, (+) ultt median nerve, t/s >75% loss in B rotation, poor lower rib expansion, +R psoas, L parspinals, R pec, and t/s paraspinal tightness. sor reduced c2/3 mobility with increased mm tension corresponding areas. Reduced posterior talus mobility and R hallux extension. There are multiple biomechanical issues that are contributing to multiple areas of pain.  Discussed at length with pt poc and pt was amiable to attempt another round of therapy ===========================================================================================  Eval Complexity: History HIGH Complexity :3+ comorbidities / personal factors will impact the outcome/ POC ;  Examination  HIGH Complexity : 4+ Standardized tests and measures addressing body structure, function, activity limitation and / or participation in recreation ; Presentation HIGH Complexity : Unstable and unpredictable characteristics ; Decision Making MEDIUM Complexity : FOTO score of 26-74; Overall Complexity MEDIUM  Problem List: pain affecting function, decrease ROM, decrease strength, impaired gait/ balance, decrease ADL/ functional abilitiies, decrease activity tolerance, decrease flexibility/ joint mobility and decrease transfer abilities   Treatment Plan may include any combination of the following: Therapeutic exercise, Therapeutic activities, Neuromuscular re-education, Physical agent/modality, Gait/balance training, Manual therapy, Patient education, Self Care training, Functional mobility training, Home safety training and Stair training  Patient / Family readiness to learn indicated by: asking questions, trying to perform skills and interest  Persons(s) to be included in education: patient (P)  Barriers to Learning/Limitations: yes;  other insurance  Measures taken, if barriers to learning: Modify interventions   Patient Goal (s): Reduce pain, increase strength    Patient self reported health status: good  Rehabilitation Potential: fair   Short Term Goals: To be accomplished in  2  weeks:  1. Compliant with hep  2. I with diaphragm breathing to reduce accessory mm use  3. Increase c/s rotation to A with driving   Long Term Goals: To be accomplished in  4  weeks:  1. Increase FOTO by 6 points in order to show functional improvement   2. +3 on gROC in order to show functional improvement  3.  Daily max pain </=8/10 increase participation in adls  Frequency / Duration:   Patient to be seen  3  times per week for 4  weeks:  Patient / Caregiver education and instruction: self care and activity modification  Therapist Signature: Princess Hare PT Date: 3/35/8119   Certification Period: na Time: 4:01 PM   ===========================================================================================  I certify that the above Physical Therapy Services are being furnished while the patient is under my care. I agree with the treatment plan and certify that this therapy is necessary. Physician Signature:        Date:       Time:                                        Fabiana Yin MD  Please sign and return to [facility] at [facility], [facility]. or you may fax the signed copy to [phone]. Thank you.

## 2022-04-19 NOTE — PROGRESS NOTES
[facility] PHYSICAL THERAPY - DAILY TREATMENT NOTE    Patient Name: Ana Husain        Date: 2022  : 1990   yes Patient  Verified  Visit #:   1   of   12  Insurance: Payor: [employer] / Plan: [employer] / Product Type: Managed Care Medicaid /      In time: 315 Out time: 345   Total Treatment Time: 30     Medicare/BCBS Time Tracking (below)   Total Timed Codes (min):  0 1:1 Treatment Time:  0     TREATMENT AREA =  Neck pain on right side [M54.2]  Arm paresthesia, right [R20.2]    SUBJECTIVE  Pain Level (on 0 to 10 scale):  8  / 10   Medication Changes/New allergies or changes in medical history, any new surgeries or procedures?    no  If yes, update Summary List   Subjective Functional Status/Changes:  []  No changes reported     See ie          OBJECTIVE    Billed With/As:   [] TE   [] TA   [] Neuro   [] Self Care Patient Education: [x] Review HEP    [] Progressed/Changed HEP based on:   [] positioning   [] body mechanics   [] transfers   [] heat/ice application    [] other:      Other Objective/Functional Measures:    Due to pt insurance unable to admin hep  See ie     Post Treatment Pain Level (on 0 to 10) scale:   8   10     ASSESSMENT  Assessment/Changes in Function:     See ie     []  See Progress Note/Recertification   Patient will continue to benefit from skilled PT services to modify and progress therapeutic interventions, address functional mobility deficits, address ROM deficits, address strength deficits, analyze and address soft tissue restrictions, analyze and cue movement patterns, analyze and modify body mechanics/ergonomics, assess and modify postural abnormalities and instruct in home and community integration to attain remaining goals.    Progress toward goals / Updated goals:    See ie     PLAN  []  Upgrade activities as tolerated yes Continue plan of care   []  Discharge due to :    []  Other:      Therapist: Mike Mathew, PT    Date: 2022 Time: 4:00 PM     Future Appointments   Date Time Provider Muna Chin   4/21/2022 10:00 AM [address] MOB MRI 1 RMMRID [address]   5/4/2022 11:00 AM Barb Meyer, PT [id number]   5/11/2022 12:30 PM Barb Meyer,  [address] [facility]   5/12/2022 11:00 AM Barb Meyer,  [address] [facility]   5/17/2022 10:15 AM Barb Meyer,  [address] [facility]   5/19/2022 10:15 AM Barb Meyer,  [address] [facility]   5/24/2022 10:15 AM Barb Meyer,  [address] [facility]   5/26/2022 10:15 AM Александр Peterson,  [address] [facility]   5/31/2022 11:00 AM Gillian Peterson, PT [id number]

## 2022-05-04 ENCOUNTER — HOSPITAL ENCOUNTER (OUTPATIENT)
Dept: PHYSICAL THERAPY | Age: 32
Discharge: HOME | End: 2022-05-04
Attending: FAMILY MEDICINE
Payer: MEDICAID

## 2022-05-04 PROCEDURE — 97112 NEUROMUSCULAR REEDUCATION: CPT | Performed by: PHYSICAL THERAPIST

## 2022-05-04 PROCEDURE — 97110 THERAPEUTIC EXERCISES: CPT | Performed by: PHYSICAL THERAPIST

## 2022-05-04 NOTE — PROGRESS NOTES
[facility] PHYSICAL THERAPY - DAILY TREATMENT NOTE    Patient Name: Nati Johnson        Date: 2022  : 1990   yes Patient  Verified  Visit #:   2   of   12  Insurance: Payor: Prudencio Sheets / Plan: [id number] [place] [address] / Product Type: Managed Care Medicaid /      In time: 1100 Out time: 1130   Total Treatment Time: 30     Medicare/BCBS Time Tracking (below)   Total Timed Codes (min):  30 1:1 Treatment Time:  30     TREATMENT AREA =  Neck pain on right side [M54.2]  Arm paresthesia, right [R20.2]    SUBJECTIVE  Pain Level (on 0 to 10 scale):  7  / 10   Medication Changes/New allergies or changes in medical history, any new surgeries or procedures?    no  If yes, update Summary List   Subjective Functional Status/Changes:  []  No changes reported     I have a difficult time with keeping my eye balls form going down. OBJECTIVE    10 min Therapeutic Exercise:  [x]  See flow sheet   Rationale:      increase ROM and increase strength to improve the patients ability to complete adls      7 min Manual Therapy: Sor/mfr c/s paraspinals   Rationale:      decrease pain, increase ROM and increase tissue extensibility to improve patient's ability to complete adls   The manual therapy interventions were performed at a separate and distinct time from the therapeutic activities interventions. 13 min Neuromuscular Re-ed: [x]  See flow sheet   Rationale:    increase ROM and increase strength to improve the patients ability to complete adls       Billed With/As:   [] TE   [] TA   [] Neuro   [] Self Care Patient Education: [x] Review HEP    [] Progressed/Changed HEP based on:   [] positioning   [] body mechanics   [] transfers   [] heat/ice application    [] other:      Other Objective/Functional Measures:    ttp along R supraclavicular region, sor  Significant difficult maintaining any sort of neutral spine - sitting/standing with use of wall.  Unable to reproduce \"twisting\" sensation in arm.    Post Treatment Pain Level (on 0 to 10) scale:   7  / 10     ASSESSMENT  Assessment/Changes in Function:     No better no worse in regards to sx      []  See Progress Note/Recertification   Patient will continue to benefit from skilled PT services to modify and progress therapeutic interventions, address functional mobility deficits, address ROM deficits, address strength deficits, analyze and address soft tissue restrictions, analyze and cue movement patterns, analyze and modify body mechanics/ergonomics, assess and modify postural abnormalities and instruct in home and community integration to attain remaining goals.    Progress toward goals / Updated goals:    No change with goals thus far      PLAN  []  Upgrade activities as tolerated yes Continue plan of care   []  Discharge due to :    []  Other:      Therapist: Shivani Love, PT    Date: 5/4/2022 Time: 9:01 AM     Future Appointments   Date Time Provider Muna Chin   5/4/2022 11:00 AM Lawrance Ing, PT [facility]   5/11/2022 12:30 PM Lawrance Ing, PT [facility]   5/12/2022  9:00 AM [facility] MOB MRI 1 RMMRID [facility]   5/12/2022 11:00 AM Lawrance Ing, PT [facility]   5/17/2022 10:15 AM Lawrance Ing, PT [facility]   5/19/2022 10:15 AM Lawrance Ing, PT [facility]   5/24/2022 10:15 AM Lawrance Ing, PT [facility]   5/26/2022 10:15 AM Gillian Peterson, PT [facility]   5/31/2022 11:00 AM Gillian Peterson, PT [id number]

## 2022-05-11 ENCOUNTER — HOSPITAL ENCOUNTER (OUTPATIENT)
Dept: PHYSICAL THERAPY | Age: 32
Discharge: HOME | End: 2022-05-11
Attending: FAMILY MEDICINE
Payer: MEDICAID

## 2022-05-11 PROCEDURE — 97112 NEUROMUSCULAR REEDUCATION: CPT | Performed by: PHYSICAL THERAPIST

## 2022-05-11 PROCEDURE — 97110 THERAPEUTIC EXERCISES: CPT | Performed by: PHYSICAL THERAPIST

## 2022-05-11 NOTE — PROGRESS NOTES
[facility] PHYSICAL THERAPY - DAILY TREATMENT NOTE    Patient Name: Navdeep Anthony        Date: 2022  : 1990   yes Patient  Verified  Visit #:   3   of   12  Insurance: Payor: Joanne Rucker / Plan: [facility] HEALTHKEEPERS PLUS / Product Type: Managed Care Medicaid /      In time: 427 Out time: 950   Total Treatment Time: 25     Medicare/BCBS Time Tracking (below)   Total Timed Codes (min):  25 1:1 Treatment Time: 25     TREATMENT AREA =  Neck pain on right side [M54.2]  Arm paresthesia, right [R20.2]    SUBJECTIVE  Pain Level (on 0 to 10 scale):  7  / 10   Medication Changes/New allergies or changes in medical history, any new surgeries or procedures?    no  If yes, update Summary List   Subjective Functional Status/Changes:  []  No changes reported     Felt good for one day and then it slowly came back. I tried the exercises just last night. I have my mri tomorrow       OBJECTIVE    10 min Therapeutic Exercise:  [x]  See flow sheet   Rationale:      increase ROM and increase strength to improve the patients ability to complete adls      7 min Manual Therapy: Sor/mfr c/s paraspinals, t/s mobs   Rationale:      decrease pain, increase ROM and increase tissue extensibility to improve patient's ability to complete adls   The manual therapy interventions were performed at a separate and distinct time from the therapeutic activities interventions.       8 min Neuromuscular Re-ed: [x]  See flow sheet   Rationale:    increase ROM and increase strength to improve the patients ability to complete adls       Billed With/As:   [] TE   [] TA   [] Neuro   [] Self Care Patient Education: [x] Review HEP    [] Progressed/Changed HEP based on:   [] positioning   [] body mechanics   [] transfers   [] heat/ice application    [] other:      Other Objective/Functional Measures:  Cavitation noted with upper thoracic mobs, significant difficulty with basic diaphragm breathing and not clinching jaw  Session modified as pt arrived with infant child and began to cry    Post Treatment Pain Level (on 0 to 10) scale:   7  / 10     ASSESSMENT  Assessment/Changes in Function:     No better no worse in regards to sx      []  See Progress Note/Recertification   Patient will continue to benefit from skilled PT services to modify and progress therapeutic interventions, address functional mobility deficits, address ROM deficits, address strength deficits, analyze and address soft tissue restrictions, analyze and cue movement patterns, analyze and modify body mechanics/ergonomics, assess and modify postural abnormalities and instruct in home and community integration to attain remaining goals.    Progress toward goals / Updated goals:    No change with goals thus far      PLAN  []  Upgrade activities as tolerated yes Continue plan of care   []  Discharge due to :    []  Other:      Therapist: Que Smith PT    Date: 5/11/2022 Time: 9:01 AM     Future Appointments   Date Time Provider Muna Chin   5/12/2022  9:00 AM [address] [place] [address] MOB MRI 1 RMMRID [address] [place] [address]   5/17/2022 10:15 AM Charles Clifton,  [facility] [address]   5/24/2022 10:15 AM Charles Clifton  [facility] [address]   5/26/2022 10:15 AM Toy Peterson, PT [address]   5/31/2022 11:00 AM Toy Peterson, PT [address]

## 2022-05-12 ENCOUNTER — HOSPITAL ENCOUNTER (OUTPATIENT)
Dept: MRI IMAGING | Age: 32
Discharge: HOME | End: 2022-05-12
Attending: FAMILY MEDICINE
Payer: MEDICAID

## 2022-05-12 ENCOUNTER — APPOINTMENT (OUTPATIENT)
Dept: PHYSICAL THERAPY | Age: 32
End: 2022-05-12
Attending: FAMILY MEDICINE
Payer: MEDICAID

## 2022-05-12 DIAGNOSIS — R20.2 ARM PARESTHESIA, RIGHT: ICD-10-CM

## 2022-05-12 DIAGNOSIS — M54.2 NECK PAIN ON RIGHT SIDE: ICD-10-CM

## 2022-05-12 PROCEDURE — 72141 MRI NECK SPINE W/O DYE: CPT

## 2022-05-17 ENCOUNTER — HOSPITAL ENCOUNTER (OUTPATIENT)
Dept: PHYSICAL THERAPY | Age: 32
Discharge: HOME | End: 2022-05-17
Attending: FAMILY MEDICINE
Payer: MEDICAID

## 2022-05-17 PROCEDURE — 97535 SELF CARE MNGMENT TRAINING: CPT | Performed by: PHYSICAL THERAPIST

## 2022-05-17 NOTE — PROGRESS NOTES
.[facility] [address] THERAPY  [address] [place] [address] [address],[facility], [facility] - Phone: [phone]  Fax: [phone]  PROGRESS NOTE  Patient Name: Srinivasan Woods : 1990   Treatment/Medical Diagnosis: Neck pain on right side [M54.2]  Arm paresthesia, right [R20.2]   Referral Source: Pardeep Glover MD     Date of Initial Visit: 22 Attended Visits: 4 Missed Visits: 0     SUMMARY OF TREATMENT  Pt was seen for IE and 3 follow up visits with treatment consisting of therapeutic exercises/activities, manual therapy and instruction on hep/activiyt modification  CURRENT STATUS  This is pt second attempt at PT due to address continued neck/arm pain. She has made no progress. She had a cervical mri recently which was unremarkable. Discussed at length with pt next step in poc as currently physical therapy not addressing her goals reduced pain/improved mobility. She agreed to attempt a consult with pain management. Will keep her episode of care open for 30 days per company policy if she is able to return    Goal/Measure of Progress Goal Met? 1. Increase foto by 6 points in order to show functional improvment   Status at last Eval: 40/100 Current Status: Pt did not fill out n/a   2.  +3 on GROC in order to show functionla impro   Status at last Eval: na Current Status: 0 no   3. Note daily max pain </=8/10 in order to increase participation in adls   Status at last Eval: 10/10 Current Status: 10/10 no       RECOMMENDATIONS  Await consult with specialist to address continued pain  If you have any questions/comments please contact us directly at [phone]. Thank you for allowing us to assist in the care of your patient.     Therapist Signature: Gretel Samaniego PT Date: 2022     Time: 10:47 AM   NOTE TO PHYSICIAN:  PLEASE COMPLETE THE ORDERS BELOW AND FAX TO   [facility]: [phone]  If you are unable to process this request in 21 hours please contact our office: [phone]    ___ I have read the above report and request that my patient continue as recommended.   ___ I have read the above report and request that my patient continue therapy with the following changes/special instructions:_________________________________________________________   ___ I have read the above report and request that my patient be discharged from therapy.      Physician Signature:        Date:       Time:                                 Leeanna Gaines MD

## 2022-05-17 NOTE — PROGRESS NOTES
[facility] - DAILY TREATMENT NOTE    Patient Name: David Boggs        Date: 2022  : 1990   yes Patient  Verified  Visit #:   4   of   12  Insurance: Payor: [employer] MEDICAID / Plan: [facility] [employer] [place] [employer]ALTHKEEPERS PLUS / Product Type: Managed Care Medicaid /      In time: 77 Out time: 70   Total Treatment Time: 25     Medicare/BCBS Time Tracking (below)   Total Timed Codes (min):  25 1:1 Treatment Time:  25     TREATMENT AREA =  Neck pain on right side [M54.2]  Arm paresthesia, right [R20.2]    SUBJECTIVE  Pain Level (on 0 to 10 scale):  7  / 10   Medication Changes/New allergies or changes in medical history, any new surgeries or procedures?    no  If yes, update Summary List   Subjective Functional Status/Changes:  []  No changes reported     See pn          OBJECTIVE          25 min Self Care: Hep, poc, mri findings   Rationale:    decrease pin to improve the patients ability to complete adls    Billed With/As:   [] TE   [] TA   [] Neuro   [] Self Care Patient Education: [x] Review HEP    [] Progressed/Changed HEP based on:   [] positioning   [] body mechanics   [] transfers   [] heat/ice application    [] other:      Other Objective/Functional Measures:    Demo verbal understanding to sc  See pn     Post Treatment Pain Level (on 0 to 10) scale:    10     ASSESSMENT  Assessment/Changes in Function:     See pn     []  See Progress Note/Recertification   Patient will continue to benefit from skilled PT services to modify and progress therapeutic interventions, address functional mobility deficits, address ROM deficits, address strength deficits, analyze and address soft tissue restrictions, analyze and cue movement patterns, analyze and modify body mechanics/ergonomics, assess and modify postural abnormalities and instruct in home and community integration to attain remaining goals.    Progress toward goals / Updated goals:    See pn     PLAN  []  Upgrade activities as tolerated no Continue plan of care   []  Discharge due to :    []  Other:      Therapist: Radha Chilel PT    Date: 5/17/2022 Time: 10:55 AM     Future Appointments   Date Time Provider Muna Chin   5/24/2022 10:15 AM Umm West, PT [facility]   5/26/2022 10:15 AM Umm West, PT [facility]   5/31/2022 11:00 AM Ranjeet Peterson, PT [facility] [phone]

## 2022-05-19 ENCOUNTER — APPOINTMENT (OUTPATIENT)
Dept: PHYSICAL THERAPY | Age: 32
End: 2022-05-19
Attending: FAMILY MEDICINE
Payer: MEDICAID

## 2022-05-20 ENCOUNTER — VIRTUAL VISIT (OUTPATIENT)
Dept: SPORTS MEDICINE | Age: 32
End: 2022-05-20

## 2022-05-20 NOTE — PROGRESS NOTES
This encounter was created in error - please disregard. Yecenia Choe was arrived for virtual visit via telephone, however contact via telephone or . tulio was not able to be established for the actual visit.

## 2022-05-24 ENCOUNTER — APPOINTMENT (OUTPATIENT)
Dept: PHYSICAL THERAPY | Age: 32
End: 2022-05-24
Attending: FAMILY MEDICINE
Payer: MEDICAID

## 2022-05-26 ENCOUNTER — APPOINTMENT (OUTPATIENT)
Dept: PHYSICAL THERAPY | Age: 32
End: 2022-05-26
Attending: FAMILY MEDICINE
Payer: MEDICAID

## 2022-05-31 ENCOUNTER — APPOINTMENT (OUTPATIENT)
Dept: PHYSICAL THERAPY | Age: 32
End: 2022-05-31
Attending: FAMILY MEDICINE
Payer: MEDICAID

## 2022-06-14 NOTE — PROGRESS NOTES
[address] [place] [address] PHYSICAL THERAPY  [address] [place] [address] 201,[facility], [facility] - Phone: [phone]  Fax: [phone]  DISCHARGE NOTE  Patient Name: Obey Cortez : 1990   Treatment/Medical Diagnosis: Cervicalgia [M54.2]   Referral Source: Rebecca Tracy MD     Date of Initial Visit: 10/25/21 Attended Visits: 20 Missed Visits: 0     SUMMARY OF TREATMENT  Therapeutic exercises and activities for thoracic/rib mobility, neuromuscular education on breathing techniques, manual therapy as approved by insurance and instruction on hep/actvity modification     CURRENT STATUS  Pt had to abruptly stop therapy due to insurance coverage. Formal assessment not performed and the below findings were as of that dayte  Goal/Measure of Progress Goal Met? 1.  FOTO Score 60 points   Status at last Eval: 39 Current Status: Did not fill out n/a   2. GROC +4   Status at last Eval: +1 Current Status: GROC +2 no   3. Pain 5/10 at worst   Status at last Eval: 810 at worst Current Status: 6/10 at worst no         RECOMMENDATIONS  D/c due to insurance  . NOTE TO PHYSICIAN:  Your patient's insurance requires this discharge note be signed and returned. PLEASE COMPLETE THE ORDERS BELOW AND RETURN TO:  [facility]    ___ I have read the above report and request that my patient be discharged from therapy. Physician Signature:        Date:       Time:                                        Rebecca Tracy MD  If you have any questions/comments please contact us directly at [phone]. Thank you for allowing us to assist in the care of your patient.     Therapist Signature: Franco Ny PT Date: 2022     Time: 6:27 PM   NOTE TO PHYSICIAN:  PLEASE COMPLETE THE ORDERS BELOW AND FAX TO   [facility]: [phone]  If you are unable to process this request in 24 hours please contact our office: [phone]    ___ I have read the above report and request that my patient continue as recommended.   ___ I have read the above report and request that my patient continue therapy with the following changes/special instructions:_________________________________________________________   ___ I have read the above report and request that my patient be discharged from therapy.      Physician Signature:        Date:       Time:                                 Vashti Moya MD

## 2022-06-27 ENCOUNTER — VIRTUAL VISIT (OUTPATIENT)
Dept: SPORTS MEDICINE | Age: 32
End: 2022-06-27
Payer: MEDICAID

## 2022-06-27 DIAGNOSIS — M25.531 RIGHT WRIST PAIN: Primary | ICD-10-CM

## 2022-06-27 DIAGNOSIS — M54.2 NECK PAIN ON RIGHT SIDE: ICD-10-CM

## 2022-06-27 DIAGNOSIS — M65.4 DE QUERVAIN'S TENOSYNOVITIS, RIGHT: ICD-10-CM

## 2022-06-27 PROCEDURE — 99213 OFFICE O/P EST LOW 20 MIN: CPT | Performed by: FAMILY MEDICINE

## 2022-06-27 NOTE — PROGRESS NOTES
[facility]  Sports Medicine - Telemedicine Follow-Up Note    Keri Mast is a 33 y.o. female (: 1990) presenting to address:  Chief Complaint   Patient presents with    Neck Pain       PCP: None  Referring provider: self-referral         Assessment & Plan:       ICD-10-CM ICD-9-CM   1. Right wrist pain  M25.531 719.43   2. De Quervain's tenosynovitis, right  M65.4 727.04   3. Neck pain on right side  M54.2 723.1       Discussion:  30yo 1yr postpartum RIGHT hand dominant female presents with ongoing RIGHT wrist pain, as well as follow-up on chronic RIGHT sided neck pain. Since last visit (3/14/2022): Had c-spine MRI which was only remarkable for very small disc protrusions. Attempted to return to formal physical therapy, however had time & financial barriers. Notes that PT was quite beneficial, but not long lasting. \"Honestly I'm just getting used to the pain now. It comes and goes, but it is a thing. \"  Is seeing [address] [place] [address] for RIGHT wrist pain, is s/p DeQ injection x2. \"first one lasted 5mo, but the 2nd one lasted only 2 weeks. \"  Has a referral for OT, however they have a long wait. Wants to switch locations, but was unsuccessful at contacting [address] [place] [address] office to have her OT script sent elsewhere. Is wondering if \"anyone ever just skips the OT and goes to surgery\"  Recently was given PO methyprednisolone, but only took the first day of the pack, noted significant adverse effects, and has not resumed.       Impression:  RIGHT wrist pain  Chronic RIGHT sided neck pain    Plan:  > referral to OT sent to [facility] Doyle per patient request  > strongly encouraged applying ice to painful side of wrist (she admits less-than-ideal compliance with this)  > emphasized importance of brace compliance as well    CC: Dr. Zoya Motta This Encounter   [address] [facility]     Referral Priority:   Routine     Referral Type:   PT/OT/ST     Referral Reason:   Specialty Services Required     Requested Specialty:   Occupational Therapy     Number of Visits Requested:   1         Management plan & patient instructions discussed with Jose Raul Thompson, who voiced understanding. Ede Haynes MD  Internal Medicine, Family Medicine & Sports Medicine  6/27/2022      Subjective   Subjective:     Chief Complaint   Patient presents with    Neck Pain       [ see \"since last visit\" in A/P section ]      Prior to Admission medications    Medication Sig Start Date End Date Taking? Authorizing Provider   buprenorphine HCl/naloxone HCl (SUBOXONE SL) 3 mg by SubLINGual route. Provider, Historical     No Known Allergies    Patient Active Problem List    Diagnosis Date Noted    Generalized anxiety disorder 02/28/2013     Past Medical History:   Diagnosis Date    Headache(784.0)      History reviewed. No pertinent surgical history. Family History   Problem Relation Age of Onset    Asthma Mother     Asthma Brother      Social History     Tobacco Use    Smoking status: Current Some Day Smoker     Packs/day: 0.25     Years: 1.00     Pack years: 0.25    Smokeless tobacco: Never Used   Substance Use Topics    Alcohol use: No     Social History     Social History Narrative    Not on file          Objective   Objective:   Vital Signs: (As obtained by patient/caregiver at home)  There were no vitals taken for this visit. Physical Exam  Nursing note reviewed. Constitutional:       General: She is not in acute distress. Appearance: Normal appearance. She is well-developed. She is not diaphoretic. HENT:      Head: Normocephalic and atraumatic. Mouth/Throat:      Comments: Mask in place  Eyes:      Conjunctiva/sclera: Conjunctivae normal.   Musculoskeletal:      Cervical back: Neck supple. Skin:     General: Skin is warm and dry. Findings: No rash. Neurological:      Mental Status: She is alert and oriented to person, place, and time. Psychiatric:         Behavior: Behavior normal. Behavior is cooperative. Thought Content: Thought content normal.           Labs / Results:     MRI Results (maximum last 3): Results from Hospital Encounter encounter on 05/12/22    MRI CERV SPINE WO CONT    Narrative  MR CERVICAL SPINE WITHOUT CONTRAST    HISTORY: Neck pain radiating down right side of the neck    COMPARISON: Radiographs 9/3/2021    TECHNIQUE: Multisequence multiplanar imaging through the cervical spine. FINDINGS:    Alignment: Straightening of the lordosis without subluxation. Vertebral body height: Normal  Marrow signal: Unremarkable  Cervicomedullary Junction: Patent  Cervical cord: No cord expansion or atrophy. Further discussed below where  relevant. On axial imaging, findings at each level are as follows:    C2/C3: Unremarkable disc. Patent canal and foramina. C3/C4: Unremarkable disc. Patent canal and foramina. C4/C5: Minimal disc bulge. Patent canal and foramina. C5/C6: Small central protrusion partially effacing ventral CSF space. Patent  canal and foramina. C6/C7: Small central protrusion partially effacing ventral CSF space. Patent  canal and foramina. C7/T1: Unremarkable disc. Patent canal and foramina. Other structures: Unremarkable    Impression  1. Small disc protrusions at the C5-6 and C6-7 without neural compression or  significant stenosis. Review of Records:     Ortho hand (6/20/22): medrol dose lydia  Ortho hand (4/20/22): injection RIGHT wrist  Ortho hand (10/5/21): injection RIGHT wrist             David Boggs, who was evaluated through a synchronous (real-time) audio-video encounter, and/or her healthcare decision maker, is aware that it is a billable service, which includes applicable co-pays, with coverage as determined by her insurance carrier. She provided verbal consent to proceed and patient identification was verified.  This visit was conducted pursuant to the emergency declaration under the [facility], [address] [place] [address] waiver authority and the [employer] General Act. A caregiver was present when appropriate. Ability to conduct physical exam was limited. The patient was located at home in a state where the provider was licensed to provide care. An electronic signature was used to authenticate this note.

## 2022-06-27 NOTE — PROGRESS NOTES
[facility]  [facility] [address],[facility], [address] - Phone: [phone]  Fax: [phone]  DISCHARGE NOTE  Patient Name: Navdeep Anthony : 1990   Treatment/Medical Diagnosis: Neck pain on right side [M54.2]  Arm paresthesia, right [R20.2]   Referral Source: Bella Hyatt MD     Date of Initial Visit: 22 Attended Visits: 4 Missed Visits: 0     SUMMARY OF TREATMENT  Pt was seen for IE and 3 follow up visits with treatment consisting of therapeutic exercises/activities, manual therapy and instruction on hep/activiyt modification  CURRENT STATUS  This is pt second attempt at PT due to address continued neck/arm pain. She has made no progress. She had a cervical mri recently which was unremarkable. Discussed at length with pt next step in poc as currently physical therapy not addressing her goals reduced pain/improved mobility. She agreed to attempt a consult with pain management. Will keep her episode of care open for 30 days per company policy if she is able to return    Goal/Measure of Progress Goal Met? 1. Increase foto by 6 points in order to show functional improvment   Status at last Eval: 40/100 Current Status: Pt did not fill out n/a   2.  +3 on GROC in order to show functionla impro   Status at last Eval: na Current Status: 0 no   3. Note daily max pain </=8/10 in order to increase participation in adls   Status at last Eval: 10/10 Current Status: 10/10 no       RECOMMENDATIONS  REFERRED BACK TO MD DUE TO LACK OF PROGRESS. D/C   . Damian Parrish NOTE TO PHYSICIAN:  Your patient's insurance requires this discharge note be signed and returned. PLEASE COMPLETE THE ORDERS BELOW AND RETURN TO:  [facility] PHYSICAL THERAP[facility]    ___ I have read the above report and request that my patient be discharged from therapy.      Physician Signature:        Date:       Time:                                        Bella Hyatt MD    If you have any questions/comments please contact us directly at [phone]. Thank you for allowing us to assist in the care of your patient. Therapist Signature: Melany Herrera PT Date: 6/27/2022     Time: 10:47 AM   NOTE TO PHYSICIAN:  PLEASE COMPLETE THE ORDERS BELOW AND FAX TO   [facility] Physical Therapy: [phone]  If you are unable to process this request in 24 hours please contact our office: [phone]    ___ I have read the above report and request that my patient continue as recommended.   ___ I have read the above report and request that my patient continue therapy with the following changes/special instructions:_________________________________________________________   ___ I have read the above report and request that my patient be discharged from therapy.      Physician Signature:        Date:       Time:                                 Tavia Haywood MD

## 2024-01-09 ENCOUNTER — HOSPITAL ENCOUNTER (OUTPATIENT)
Facility: HOSPITAL | Age: 34
Setting detail: RECURRING SERIES
Discharge: HOME | End: 2024-01-12
Payer: MEDICAID

## 2024-01-09 PROCEDURE — 97161 PT EVAL LOW COMPLEX 20 MIN: CPT

## 2024-01-09 NOTE — PROGRESS NOTES
PT DAILY TREATMENT NOTE/KNEE EVAL       Patient Name: Rodrick Rajput    Date: 2024    : 1990  Insurance: Payor: [employer] MEDICARE / Plan: [facility] HEALTHKEEPERS MEDICARE / Product Type: *No Product type* /      Patient  verified yes     Visit #   Current / Total 1 4-8   Time   In / Out 9:00 9:35   Pain   In / Out 0 0   Subjective Functional Status/Changes: See eval     Treatment Area: Left knee pain [M25.562]    SUBJECTIVE  Pain Level (0-10 scale): Currently 0/10, Best 0/10, Worst 8/10  []constant [x]intermittent []improving []worsening []no change since onset  Any medication changes, allergies to medications, adverse drug reactions, diagnosis change, or new procedure performed?: [x] No    [] Yes (see summary sheet for update)  Subjective functional status/changes:     Reports she's been having off and on pain for the past year. Hurt for a week before made appointment. Fine right now. Last flare up was ~2 weeks ago, and they usually last 4-5 days.    Almost 12 weeks pregnant.   Aggravating: movement. Walking. Getting in/out of bed. Steps.   Feel \"grinding.\" When bad \"feels like knee is going to pop off.\" \"Dull throbbing\"  Alleviating: being still with knee bent.   No known SNEHAL.   Used to walk a lot as was a . Stopped ~1.5 year ago.   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 When she does, it feels dull and throbbing behind her knee cap and like her \"knee is going to pop off.\" Even when she has no pain, she feels a \"grinding\" in her knee  Previous Treatment/Compliance: N/a  PMHx/Surgical Hx: Pregnant  Work Hx: N/a  Living Situation: steps to porch but none inside.   Motivation: Excellent.     Rodrick Rajput is a 33 y.o.  yo female who presents to PT for L knee pain, reporting insidious onset with no known SNEHAL. Pt reports she's been having pain off and on for the last year. Her last flare up was

## 2024-01-09 NOTE — PROGRESS NOTES
[facility]   [address], [address], [place] [address] Ph: [phone] Fx: [phone]  Plan of Care / Statement of Necessity for Physical Therapy Services     Patient Name: Rodrick Rajput : 1990   Medical   Diagnosis: Left knee pain [M25.562] Treatment Diagnosis:   M25.562  LEFT KNEE PAIN     Onset Date: ~1 year ago     Referral Source: Reymundo Alvarez MD Start of Care (SOC): 2024   Prior Hospitalization: See medical history Provider #: [id number]   Prior Level of Function: Mom to a toddler, no pain with ADLs/household mobility   Comorbidities: 12 weeks pregnant, back pain     Assessment / key information:  Rodrick Rajupt is a 33 y.o. female who presents to PT for L knee pain, reporting insidious onset with no known SNEHAL. Pt reports she's been having pain off and on for the last year. Her last flare up was ~2 weeks ago and lasted for 4-5 days. Pt rates pain as 8/10 max, 0/10 at best, 0/10 today, described as a dull, throbbing pain located behind her L knee cap.  Aggravating factors include movement, walking, getting in/out of bed, and going up/down steps. Alleviating factors include being still and massaging her knee. Pt is currently 12 weeks pregnant. Pt would benefit from skilled PT to address strength and function to improve overall QOL and improve ability to be caregiver to her child. Pertinent exam findings include:      ROM / Strength  [] Unable to assess                  AROM                  Left Right   Knee Flexion 135 135     Extension Hyper 5 Hyper 5      Strength      Left Right   Hip Flexion 5 5     Extension 4- 5     Abduction 3+ 4     Adduction 4- 5     Glut Max 4 5   Knee Flexion 5 5     Extension 5 5   Ankle Plantarflexion 5 5     Dorsiflexion 5 5      Flexibility: [] Unable to assess at this time  Hamstrings:               Popliteal Angle: less than 20 degrees bilaterally indicative of good hamstring flexibility

## 2024-01-23 ENCOUNTER — APPOINTMENT (OUTPATIENT)
Facility: HOSPITAL | Age: 34
End: 2024-01-23
Payer: MEDICAID

## 2024-01-29 ENCOUNTER — TELEPHONE (OUTPATIENT)
Facility: HOSPITAL | Age: 34
End: 2024-01-29

## 2024-01-30 ENCOUNTER — HOSPITAL ENCOUNTER (OUTPATIENT)
Facility: HOSPITAL | Age: 34
Setting detail: RECURRING SERIES
Discharge: HOME | End: 2024-02-02
Payer: MEDICAID

## 2024-01-30 PROCEDURE — 97110 THERAPEUTIC EXERCISES: CPT

## 2024-01-30 PROCEDURE — 97112 NEUROMUSCULAR REEDUCATION: CPT

## 2024-01-30 PROCEDURE — 97530 THERAPEUTIC ACTIVITIES: CPT

## 2024-01-30 NOTE — PROGRESS NOTES
PHYSICAL / OCCUPATIONAL THERAPY - DAILY TREATMENT NOTE    Patient Name: Rodrick Rajput    Date: 2024    : 1990  Insurance: Payor: [facility] MEDICAID / Plan: [facility] HEALTHKEEPERS PLUS / Product Type: *No Product type* /      Patient  verified Yes     Visit #   Current / Total 2 8   Time   In / Out 9:40 10:20   Pain   In / Out 1 1   Subjective Functional Status/Changes: Reports pain over the last couple of days, doesn't know why. Mainly at night.      TREATMENT AREA =  Left knee pain [M25.562]    OBJECTIVE    Therapeutic Procedures:    06249 Therapeutic Exercise (timed):  increase ROM, strength, coordination, balance, and proprioception to improve patient's ability to progress to PLOF and address remaining functional goals.   Tx Min Billable or 1:1 Min   (if diff from Tx Min) Details:   15  See flow sheet as applicable     49872 Neuromuscular Re-Education (timed):  improve balance, coordination, kinesthetic sense, posture, core stability and proprioception to improve patient's ability to develop conscious control of individual muscles and awareness of position of extremities in order to progress to PLOF and address remaining functional goals.   Tx Min Billable or 1:1 Min   (if diff from Tx Min) Details:   15  See flow sheet as applicable     34178 Therapeutic Activity (timed):  use of dynamic activities replicating functional movements to increase ROM, strength, coordination, balance, and proprioception in order to improve patient's ability to progress to PLOF and address remaining functional goals.   Tx Min Billable or 1:1 Min   (if diff from Tx Min) Details:   10  See flow sheet as applicable       40  [facility] Totals Reminder: bill using total billable min of TIMED therapeutic procedures (example: do not include dry needle or estim unattended, both untimed codes, in totals to left)  8-22 min = 1 unit; 23-37 min = 2 units; 38-52 min = 3 units; 53-67 min = 4 units; 68-82 min = 5 units

## 2025-06-26 NOTE — PROGRESS NOTES
[facility] PHYSICAL [facility]ERAP[facility] - DAILY TREATMENT NOTE    Patient Name: Emily Herrera        Date: 2022  : 1990   yes Patient  Verified  Visit #:     Insurance: Payor: Gera Meaghan / Plan: [employer] PLUS / Product Type: Managed Care Medicaid /      In time: 1100 Out time: 9753   Total Treatment Time:      Medicare/BCBS Time Tracking (below)   Total Timed Codes (min):  33 1:1 Treatment Time:  33     TREATMENT AREA =  Cervicalgia [M54.2]    SUBJECTIVE  Pain Level (on 0 to 10 scale):  4 / 10   Medication Changes/New allergies or changes in medical history, any new surgeries or procedures?    no  If yes, update Summary List   Subjective Functional Status/Changes:  []  No changes reported     Felt really good after last time     OBJECTIVE  11 min Therapeutic exercises  [x]  See flow sheet   Rationale:    increase strength and increase proprioception to improve the patients ability to transfer       7 min Manual Therapy: r rib 2-4 pa, ribs 7-19 inward roll,    Rationale:      decrease pain, increase ROM, increase tissue extensibility and decrease trigger points to improve patient's ability to complete adls  The manual therapy interventions were performed at a separate and distinct time from the therapeutic activities interventions.     15 min Neuromuscular Re-ed: [x]  See flow sheet   Rationale:    increase strength and increase proprioception to improve the patients ability to transfer       Billed With/As:   [] TE   [] TA   [] Neuro   [] Self Care Patient Education: [x] Review HEP    [] Progressed/Changed HEP based on:   [] positioning   [] body mechanics   [] transfers   [] heat/ice application    [] other:      Other Objective/Functional Measures:  Performed similar session as prior based on pt subjective  ttp along R psoas   Post Treatment Pain Level (on 0 to 10) scale:   3 / 10     ASSESSMENT  Assessment/Changes in Function:     No increase in pain following session not feeling well.    []  See Progress Note/Recertification   Patient will continue to benefit from skilled PT services to modify and progress therapeutic interventions, address functional mobility deficits, address ROM deficits, address strength deficits, analyze and address soft tissue restrictions, analyze and cue movement patterns, analyze and modify body mechanics/ergonomics, assess and modify postural abnormalities and instruct in home and community integration to attain remaining goals.    Progress toward goals / Updated goals:    Progress with pain reduction this week     PLAN  []  Upgrade activities as tolerated yes Continue plan of care   []  Discharge due to :    []  Other:      Therapist: Janeen Recinos, PT    Date: 1/13/2022 Time: 7:01 AM     Future Appointments   Date Time Provider Muna Chin   1/25/2022 10:15 AM Yonis Asher,  [address] [facility]   1/28/2022  9:30 AM Asia Peterson, PT [id number]